# Patient Record
Sex: MALE | Race: ASIAN | NOT HISPANIC OR LATINO
[De-identification: names, ages, dates, MRNs, and addresses within clinical notes are randomized per-mention and may not be internally consistent; named-entity substitution may affect disease eponyms.]

---

## 2020-10-06 ENCOUNTER — APPOINTMENT (OUTPATIENT)
Dept: PHYSICAL MEDICINE AND REHAB | Facility: CLINIC | Age: 65
End: 2020-10-06

## 2020-10-23 ENCOUNTER — APPOINTMENT (OUTPATIENT)
Dept: NEUROSURGERY | Facility: CLINIC | Age: 65
End: 2020-10-23
Payer: COMMERCIAL

## 2020-10-23 VITALS
HEIGHT: 68 IN | HEART RATE: 79 BPM | SYSTOLIC BLOOD PRESSURE: 108 MMHG | DIASTOLIC BLOOD PRESSURE: 70 MMHG | WEIGHT: 173 LBS | BODY MASS INDEX: 26.22 KG/M2

## 2020-10-23 PROCEDURE — 99072 ADDL SUPL MATRL&STAF TM PHE: CPT

## 2020-10-23 PROCEDURE — 99203 OFFICE O/P NEW LOW 30 MIN: CPT

## 2020-10-23 NOTE — ASSESSMENT
[FreeTextEntry1] : 65 year old male with low back pain and possible lumbar facet arthropathy.  We will begin with a trial of medically supervised PT.  As he has not had any imaging of his spine, we will obtain MRI lumbar spine to help determine the etiology of his pain.  He will return to see me once he has completed the study so that we may review the results and discuss his further treatment options.

## 2020-10-23 NOTE — HISTORY OF PRESENT ILLNESS
[de-identified] : Patient has been to the ER 5 times over the past few years secondary to severe excruciating lower back pain.  He has also been bed-bound for 7 days when his pain is severe.   [Back] : back [___ yrs] : [unfilled] year(s) ago [10] : a maximum pain level of 10/10 [Burning] : burning [Standing] : standing [Sneezing] : sneezing [Heat] : heat [PT] : PT [Medications] : medications [FreeTextEntry6] : oxycodone

## 2020-11-08 ENCOUNTER — APPOINTMENT (OUTPATIENT)
Dept: MRI IMAGING | Facility: IMAGING CENTER | Age: 65
End: 2020-11-08
Payer: COMMERCIAL

## 2020-11-08 ENCOUNTER — OUTPATIENT (OUTPATIENT)
Dept: OUTPATIENT SERVICES | Facility: HOSPITAL | Age: 65
LOS: 1 days | End: 2020-11-08
Payer: COMMERCIAL

## 2020-11-08 DIAGNOSIS — M51.36 OTHER INTERVERTEBRAL DISC DEGENERATION, LUMBAR REGION: ICD-10-CM

## 2020-11-08 PROCEDURE — 72148 MRI LUMBAR SPINE W/O DYE: CPT

## 2020-11-08 PROCEDURE — 72148 MRI LUMBAR SPINE W/O DYE: CPT | Mod: 26

## 2020-11-20 ENCOUNTER — APPOINTMENT (OUTPATIENT)
Dept: NEUROSURGERY | Facility: CLINIC | Age: 65
End: 2020-11-20
Payer: COMMERCIAL

## 2020-11-20 VITALS
WEIGHT: 176 LBS | HEIGHT: 68 IN | BODY MASS INDEX: 26.67 KG/M2 | SYSTOLIC BLOOD PRESSURE: 117 MMHG | HEART RATE: 80 BPM | DIASTOLIC BLOOD PRESSURE: 72 MMHG

## 2020-11-20 VITALS — TEMPERATURE: 97.1 F

## 2020-11-20 DIAGNOSIS — M25.512 PAIN IN LEFT SHOULDER: ICD-10-CM

## 2020-11-20 PROCEDURE — 99213 OFFICE O/P EST LOW 20 MIN: CPT

## 2020-11-20 NOTE — DATA REVIEWED
[de-identified] : \par   MR Lumbar Spine No Cont             Final\par \par No Documents Attached\par \par \par \par \par   EXAM:  MR SPINE LUMBAR\par \par \par PROCEDURE DATE:  11/08/2020\par \par \par \par INTERPRETATION:  EXAMINATION: MRI lumbar spine without contrast\par \par CLINICAL INFORMATION: Low back pain\par \par TECHNIQUE: Multiplanar, multisequential MR imaging was performed.\par \par FINDINGS: Conus terminates at the L1 level and is normal in signal. Vertebral body heights are maintained. Alignment is normal. There are mild multilevel Modic type II endplate changes anteriorly. There is multilevel disc degeneration.\par \par T12-L1: Small central disc protrusion which slightly effaces the anterior thecal sac without cord impingement. No spinal canal stenosis or foraminal narrowing.\par \par L1-L2: Small right paracentral disc protrusion superimposed on a small disc bulge. No spinal canal stenosis or foraminal narrowing.\par \par L2-L3: Central-left paracentral disc herniation which effaces the anterior thecal sac and abuts the descending left L3 nerve root. This is superimposed on a small disc bulge. Mild to moderate spinal canal narrowing and mild to moderate bilateral foraminal narrowing.\par \par L3-L4: Small central disc protrusion superimposed on a small disc bulge. Mild spinal canal narrowing and mild to moderate bilateral foraminal narrowing.\par \par L4-L5: Small disc bulge with mild osseous ridging. Mild to moderate bilateral foraminal narrowing. No spinal canal stenosis.\par \par L5-S1: Minimal disc bulge with central posterior annular fissure. No spinal canal stenosis or foraminal narrowing.\par \par The imaged portions of the sacroiliac joints are unremarkable.\par \par There is no paraspinal muscle atrophy or edema.\par \par IMPRESSION: Multilevel spondylosis, as above. This is most pronounced at the L2-3 level where there is a central-left paracentral disc herniation which effaces the anterior thecal sac and abuts the descending left L3 nerve root.\par \par \par \par \par \par \par KEVIN CISSE MD; Attending Radiologist\par This document has been electronically signed. Nov 10 2020  9:38AM\par \par  \par \par  Ordered by: NEERAJ GODFREY       Collected/Examined: 08Nov2020 06:46PM       \par Verified by: NEERAJ GODFREY 10Nov2020 09:53AM       \par  Result Communication: Schedule appointment to discuss results;\par Stage: Final       \par  Performed at: [facility] at the [facility]       Resulted: 10Nov2020 09:42AM       Last Updated: 10Nov2020 09:53AM       Accession: [id number]

## 2020-11-20 NOTE — ASSESSMENT
[FreeTextEntry1] : 65 year old male with low back pain and lumbar radicular pain secondary to disc herniation.  As he is not in acute pain, he will continue with PT.  He will also consult with Dr. Johnson for his left shoulder pain.

## 2020-11-20 NOTE — REASON FOR VISIT
[Follow-Up: _____] : a [unfilled] follow-up visit [FreeTextEntry1] : Patient returns after completing MRI lumbar spine.  He is here to review the results.

## 2020-12-18 ENCOUNTER — APPOINTMENT (OUTPATIENT)
Dept: ORTHOPEDIC SURGERY | Facility: CLINIC | Age: 65
End: 2020-12-18

## 2021-01-15 ENCOUNTER — APPOINTMENT (OUTPATIENT)
Dept: OPHTHALMOLOGY | Facility: CLINIC | Age: 66
End: 2021-01-15

## 2021-02-05 ENCOUNTER — TRANSCRIPTION ENCOUNTER (OUTPATIENT)
Age: 66
End: 2021-02-05

## 2021-08-17 ENCOUNTER — APPOINTMENT (OUTPATIENT)
Dept: OPHTHALMOLOGY | Facility: CLINIC | Age: 66
End: 2021-08-17
Payer: COMMERCIAL

## 2021-08-17 ENCOUNTER — NON-APPOINTMENT (OUTPATIENT)
Age: 66
End: 2021-08-17

## 2021-08-17 PROCEDURE — 92004 COMPRE OPH EXAM NEW PT 1/>: CPT

## 2022-03-25 ENCOUNTER — APPOINTMENT (OUTPATIENT)
Dept: PHYSICAL MEDICINE AND REHAB | Facility: CLINIC | Age: 67
End: 2022-03-25

## 2022-05-18 ENCOUNTER — APPOINTMENT (OUTPATIENT)
Dept: GASTROENTEROLOGY | Facility: CLINIC | Age: 67
End: 2022-05-18
Payer: COMMERCIAL

## 2022-05-18 VITALS
HEART RATE: 65 BPM | HEIGHT: 68 IN | BODY MASS INDEX: 26.52 KG/M2 | WEIGHT: 175 LBS | DIASTOLIC BLOOD PRESSURE: 66 MMHG | SYSTOLIC BLOOD PRESSURE: 110 MMHG

## 2022-05-18 DIAGNOSIS — K59.00 CONSTIPATION, UNSPECIFIED: ICD-10-CM

## 2022-05-18 PROCEDURE — 82274 ASSAY TEST FOR BLOOD FECAL: CPT | Mod: NC,QW

## 2022-05-18 PROCEDURE — 99204 OFFICE O/P NEW MOD 45 MIN: CPT

## 2022-05-18 NOTE — ASSESSMENT
[FreeTextEntry1] : 1.  Chronic constipation, may be due to endocrine disorder, medications, or slow transit.\par 2.  Mild anemia (Hb:12.7).  FIT and Hemoccult negative today.  No prior colonoscopy or endoscopy.\par 3.  Diabetes mellitus.\par 4.  Hypothyroidism.\par 5.  Chronic lower back pain/ disc degeneration.\par \par Recs:\par - Labs reviewed on son's phone.\par - The patient was advised to use a fiber supplement with adequate fluids.  He was advised to take a laxative PRN for constipation.  Samples of Miralax and Dulcolax given today but patient was advised that he could also try Senna and milk of magnesia.\par - The patient was advised to undergo a colonoscopy and possible endoscopy for evaluation of anemia.  He is reluctant to undergo procedures at this time.  The patient was advised of possible occult GI bleeding and sources of such bleeding, including GI tract malignancies.  He was advised to follow up with his PCP for surveillance of anemia.  If his Hb decreases further or he experiences overt bleeding, he was advised to reconsider procedures.

## 2022-05-18 NOTE — PHYSICAL EXAM
[General Appearance - Alert] : alert [General Appearance - In No Acute Distress] : in no acute distress [Sclera] : the sclera and conjunctiva were normal [Extraocular Movements] : extraocular movements were intact [Outer Ear] : the ears and nose were normal in appearance [Hearing Threshold Finger Rub Not Elbert] : hearing was normal [Neck Cervical Mass (___cm)] : no neck mass was observed [Auscultation Breath Sounds / Voice Sounds] : lungs were clear to auscultation bilaterally [Heart Rate And Rhythm] : heart rate was normal and rhythm regular [Heart Sounds] : normal S1 and S2 [Edema] : there was no peripheral edema [Bowel Sounds] : normal bowel sounds [Abdomen Soft] : soft [Abdomen Tenderness] : non-tender [] : no hepato-splenomegaly [Abdomen Mass (___ Cm)] : no abdominal mass palpated [Abdomen Hernia] : no hernia was discovered [Normal Sphincter Tone] : normal sphincter tone [Cervical Lymph Nodes Enlarged Anterior Bilaterally] : anterior cervical [Supraclavicular Lymph Nodes Enlarged Bilaterally] : supraclavicular [No CVA Tenderness] : no ~M costovertebral angle tenderness [Abnormal Walk] : normal gait [Musculoskeletal - Swelling] : no joint swelling seen [Skin Color & Pigmentation] : normal skin color and pigmentation [Skin Turgor] : normal skin turgor [No Focal Deficits] : no focal deficits [Oriented To Time, Place, And Person] : oriented to person, place, and time [Impaired Insight] : insight and judgment were intact [External Hemorrhoid] : no external hemorrhoids [Occult Blood Positive] : stool was negative for occult blood [FreeTextEntry1] : brown stool, FIT negative

## 2022-05-18 NOTE — HISTORY OF PRESENT ILLNESS
[Heartburn] : denies heartburn [Nausea] : denies nausea [Vomiting] : denies vomiting [Diarrhea] : denies diarrhea [Constipation] : stable constipation [Yellow Skin Or Eyes (Jaundice)] : denies jaundice [Abdominal Pain] : denies abdominal pain [Abdominal Swelling] : denies abdominal swelling [Rectal Pain] : denies rectal pain [de-identified] : Tm presents with his son for evaluation of constipation.  He had previously seen Dr. Carlin for similar complaints in 2016.\par \par The patient reports that he has had worsening constipation over the past 3-4 years.  Since he was younger, he did not move his bowels daily but his stools have become harder and he sometimes does not have an urge to defecate.  He can go 1-3 days without a BM.  The constipation symptoms occurred around the time he was diagnosed with DM and hypothyroidism.  Per Dr. Carlin's note from 2016, his BMs had improved with levothyroxine.  He denies any upper GI symptoms, abdominal or rectal pain, GI bleeding, weight loss or family history of GI malignancies.  The patient was noted to recently have a Hb of 12.7.  His PCP advised him to undergo a screening colonoscopy but the patient is not interested in one at this time.

## 2022-06-23 ENCOUNTER — INPATIENT (INPATIENT)
Facility: HOSPITAL | Age: 67
LOS: 2 days | Discharge: HOME | End: 2022-06-26
Attending: HOSPITALIST | Admitting: HOSPITALIST
Payer: COMMERCIAL

## 2022-06-23 VITALS
DIASTOLIC BLOOD PRESSURE: 76 MMHG | RESPIRATION RATE: 14 BRPM | TEMPERATURE: 97 F | SYSTOLIC BLOOD PRESSURE: 130 MMHG | HEART RATE: 62 BPM | OXYGEN SATURATION: 99 %

## 2022-06-23 DIAGNOSIS — R42 DIZZINESS AND GIDDINESS: ICD-10-CM

## 2022-06-23 LAB
ALBUMIN SERPL ELPH-MCNC: 4.2 G/DL — SIGNIFICANT CHANGE UP (ref 3.3–5)
ALP SERPL-CCNC: 73 U/L — SIGNIFICANT CHANGE UP (ref 40–120)
ALT FLD-CCNC: 27 U/L — SIGNIFICANT CHANGE UP (ref 4–41)
ANION GAP SERPL CALC-SCNC: 9 MMOL/L — SIGNIFICANT CHANGE UP (ref 7–14)
AST SERPL-CCNC: 24 U/L — SIGNIFICANT CHANGE UP (ref 4–40)
BASOPHILS # BLD AUTO: 0.04 K/UL — SIGNIFICANT CHANGE UP (ref 0–0.2)
BASOPHILS NFR BLD AUTO: 0.7 % — SIGNIFICANT CHANGE UP (ref 0–2)
BILIRUB SERPL-MCNC: 0.4 MG/DL — SIGNIFICANT CHANGE UP (ref 0.2–1.2)
BUN SERPL-MCNC: 12 MG/DL — SIGNIFICANT CHANGE UP (ref 7–23)
CALCIUM SERPL-MCNC: 9.4 MG/DL — SIGNIFICANT CHANGE UP (ref 8.4–10.5)
CHLORIDE SERPL-SCNC: 106 MMOL/L — SIGNIFICANT CHANGE UP (ref 98–107)
CO2 SERPL-SCNC: 24 MMOL/L — SIGNIFICANT CHANGE UP (ref 22–31)
CREAT SERPL-MCNC: 1.12 MG/DL — SIGNIFICANT CHANGE UP (ref 0.5–1.3)
EGFR: 73 ML/MIN/1.73M2 — SIGNIFICANT CHANGE UP
EOSINOPHIL # BLD AUTO: 0.41 K/UL — SIGNIFICANT CHANGE UP (ref 0–0.5)
EOSINOPHIL NFR BLD AUTO: 7.7 % — HIGH (ref 0–6)
FLUAV AG NPH QL: SIGNIFICANT CHANGE UP
FLUBV AG NPH QL: SIGNIFICANT CHANGE UP
GLUCOSE SERPL-MCNC: 155 MG/DL — HIGH (ref 70–99)
HCT VFR BLD CALC: 42.7 % — SIGNIFICANT CHANGE UP (ref 39–50)
HGB BLD-MCNC: 13.2 G/DL — SIGNIFICANT CHANGE UP (ref 13–17)
IANC: 2.54 K/UL — SIGNIFICANT CHANGE UP (ref 1.8–7.4)
IMM GRANULOCYTES NFR BLD AUTO: 0.7 % — SIGNIFICANT CHANGE UP (ref 0–1.5)
LYMPHOCYTES # BLD AUTO: 1.76 K/UL — SIGNIFICANT CHANGE UP (ref 1–3.3)
LYMPHOCYTES # BLD AUTO: 32.9 % — SIGNIFICANT CHANGE UP (ref 13–44)
MAGNESIUM SERPL-MCNC: 2.3 MG/DL — SIGNIFICANT CHANGE UP (ref 1.6–2.6)
MCHC RBC-ENTMCNC: 28 PG — SIGNIFICANT CHANGE UP (ref 27–34)
MCHC RBC-ENTMCNC: 30.9 GM/DL — LOW (ref 32–36)
MCV RBC AUTO: 90.7 FL — SIGNIFICANT CHANGE UP (ref 80–100)
MONOCYTES # BLD AUTO: 0.56 K/UL — SIGNIFICANT CHANGE UP (ref 0–0.9)
MONOCYTES NFR BLD AUTO: 10.5 % — SIGNIFICANT CHANGE UP (ref 2–14)
NEUTROPHILS # BLD AUTO: 2.54 K/UL — SIGNIFICANT CHANGE UP (ref 1.8–7.4)
NEUTROPHILS NFR BLD AUTO: 47.5 % — SIGNIFICANT CHANGE UP (ref 43–77)
NRBC # BLD: 0 /100 WBCS — SIGNIFICANT CHANGE UP
NRBC # FLD: 0 K/UL — SIGNIFICANT CHANGE UP
PLATELET # BLD AUTO: 170 K/UL — SIGNIFICANT CHANGE UP (ref 150–400)
POTASSIUM SERPL-MCNC: 4.2 MMOL/L — SIGNIFICANT CHANGE UP (ref 3.5–5.3)
POTASSIUM SERPL-SCNC: 4.2 MMOL/L — SIGNIFICANT CHANGE UP (ref 3.5–5.3)
PROT SERPL-MCNC: 7.2 G/DL — SIGNIFICANT CHANGE UP (ref 6–8.3)
RBC # BLD: 4.71 M/UL — SIGNIFICANT CHANGE UP (ref 4.2–5.8)
RBC # FLD: 13.9 % — SIGNIFICANT CHANGE UP (ref 10.3–14.5)
RSV RNA NPH QL NAA+NON-PROBE: SIGNIFICANT CHANGE UP
SARS-COV-2 RNA SPEC QL NAA+PROBE: SIGNIFICANT CHANGE UP
SODIUM SERPL-SCNC: 139 MMOL/L — SIGNIFICANT CHANGE UP (ref 135–145)
WBC # BLD: 5.35 K/UL — SIGNIFICANT CHANGE UP (ref 3.8–10.5)
WBC # FLD AUTO: 5.35 K/UL — SIGNIFICANT CHANGE UP (ref 3.8–10.5)

## 2022-06-23 PROCEDURE — 70496 CT ANGIOGRAPHY HEAD: CPT | Mod: 26,MD

## 2022-06-23 PROCEDURE — 99285 EMERGENCY DEPT VISIT HI MDM: CPT

## 2022-06-23 PROCEDURE — 70498 CT ANGIOGRAPHY NECK: CPT | Mod: 26,MD

## 2022-06-23 RX ORDER — ONDANSETRON 8 MG/1
4 TABLET, FILM COATED ORAL ONCE
Refills: 0 | Status: COMPLETED | OUTPATIENT
Start: 2022-06-23 | End: 2022-06-23

## 2022-06-23 RX ORDER — MECLIZINE HCL 12.5 MG
25 TABLET ORAL ONCE
Refills: 0 | Status: COMPLETED | OUTPATIENT
Start: 2022-06-23 | End: 2022-06-23

## 2022-06-23 RX ORDER — SODIUM CHLORIDE 9 MG/ML
1000 INJECTION, SOLUTION INTRAVENOUS ONCE
Refills: 0 | Status: COMPLETED | OUTPATIENT
Start: 2022-06-23 | End: 2022-06-23

## 2022-06-23 RX ADMIN — ONDANSETRON 4 MILLIGRAM(S): 8 TABLET, FILM COATED ORAL at 14:26

## 2022-06-23 RX ADMIN — Medication 25 MILLIGRAM(S): at 19:20

## 2022-06-23 RX ADMIN — Medication 1 MILLIGRAM(S): at 14:27

## 2022-06-23 RX ADMIN — SODIUM CHLORIDE 1000 MILLILITER(S): 9 INJECTION, SOLUTION INTRAVENOUS at 11:32

## 2022-06-23 RX ADMIN — Medication 25 MILLIGRAM(S): at 11:31

## 2022-06-23 NOTE — ED PROVIDER NOTE - OBJECTIVE STATEMENT
66m code stroke  Patient witnessed by family to appear like he was going to pass out at approximately 10am. Was at his baseline this morning. 66m presents to the ED with room spinning dizziness. Started in the middle of the night, had his head turned to the left, and when he tried to turn right noticed spinning sensation. Has been persistent every time he moves his head or sits up, improves when he stays still. No ha, ear pain/ringing/hearing loss/weakness/numbness/vision changes/cp/sob/abd pain, vomiting, diarrhea, dysuria. States he also has r. side neck pain-no stiffness. No h/o stroke/mi in patient or family.

## 2022-06-23 NOTE — ED PROVIDER NOTE - CLINICAL SUMMARY MEDICAL DECISION MAKING FREE TEXT BOX
66m presents to the ED for room spinning dizziness since middle of night, worsens on positional change, improves when still, no other symptoms, exam with no acute abnormal findings other than reproducible dizziness on moving. Suspect likely peripheral cause for vertigo given history and exam however given associated neck pain will check ct/cta head/neck to eval for neuro/vascular cause for symptoms, symptomatically treat, ekg, monitor.

## 2022-06-23 NOTE — ED PROVIDER NOTE - PHYSICAL EXAMINATION
GEN - NAD; well appearing; A+O x3   HEAD - NC/AT   EYES- PERRL, EOMI  ENT: Airway patent, mmm, Oral cavity and pharynx normal. No inflammation, swelling, exudate, or lesions.    NECK: Neck supple, nontender, from, no swelling, no regions of tenderness  PULMONARY - CTA b/l, symmetric breath sounds.   CARDIAC -s1s2, RRR, no M,G,R  ABDOMEN - +BS, ND, NT, soft, no guarding, no rebound, no masses   BACK - no CVA tenderness, Normal  spine   EXTREMITIES - FROM, symmetric pulses, capillary refill < 2 seconds, no edema   SKIN - no rash or bruising   NEUROLOGIC - ao3, cn2-12 intact, 5/5 strength in all extremities, sensation grossly intact, f-n nl, no dysdiadochokinesia  PSYCH -nl mood/affect, nl insight.

## 2022-06-23 NOTE — ED PROVIDER NOTE - NS ED ROS FT
ROS:  GENERAL: No fever, no chills  EYES: no change in vision  HEENT: no trouble swallowing, no trouble speaking  CARDIAC: no chest pain  PULMONARY: no cough, no shortness of breath  GI: no abdominal pain, no nausea, no vomiting, no diarrhea, no constipation  : No dysuria, no frequency, no change in appearance, or odor of urine  SKIN: no rashes  NEURO: no headache, no weakness, +spinning/dizzy on movement  MSK: No joint pain

## 2022-06-23 NOTE — ED PROVIDER NOTE - PROGRESS NOTE DETAILS
spoke with patients wife via [facility] -she is not consenting to administration of versed at this time. patient repeatedly moving and unable to sit still - asked us to speak with daughter, conversation in progress they do not want to administer medication for sedation at this time with this understanding that we cant diagnose a stroke and are unable to further assess diagnosis and administer treatment. DId explain through  that patient unable to get ct at this time-. Aden Redd, PGY3: Patient unable to ambulate despite medications. CTAs negative for acute pathology. Will give Reglan and additional Meclizine. Patient TBA given gait instability. Aden Redd, PGY3: Patient signed out to me, here with dizziness x1 day. Given Zofran, Ativan, and Meclizine. Pending CTA to eval for posterior involvement.

## 2022-06-23 NOTE — ED ADULT TRIAGE NOTE - CHIEF COMPLAINT QUOTE
Pt arrives via EMS c/o dizziness since last night, worsened upon standing. Denies CP, SOB or N/V/D. PMH: DM2, hypothyroidism, chronic back pain.

## 2022-06-24 ENCOUNTER — TRANSCRIPTION ENCOUNTER (OUTPATIENT)
Age: 67
End: 2022-06-24

## 2022-06-24 DIAGNOSIS — E03.9 HYPOTHYROIDISM, UNSPECIFIED: ICD-10-CM

## 2022-06-24 DIAGNOSIS — E11.9 TYPE 2 DIABETES MELLITUS WITHOUT COMPLICATIONS: ICD-10-CM

## 2022-06-24 DIAGNOSIS — R42 DIZZINESS AND GIDDINESS: ICD-10-CM

## 2022-06-24 DIAGNOSIS — Z29.9 ENCOUNTER FOR PROPHYLACTIC MEASURES, UNSPECIFIED: ICD-10-CM

## 2022-06-24 DIAGNOSIS — I10 ESSENTIAL (PRIMARY) HYPERTENSION: ICD-10-CM

## 2022-06-24 DIAGNOSIS — E78.5 HYPERLIPIDEMIA, UNSPECIFIED: ICD-10-CM

## 2022-06-24 LAB
24R-OH-CALCIDIOL SERPL-MCNC: 26.7 NG/ML — LOW (ref 30–80)
A1C WITH ESTIMATED AVERAGE GLUCOSE RESULT: 8 % — HIGH (ref 4–5.6)
ALBUMIN SERPL ELPH-MCNC: 3.9 G/DL — SIGNIFICANT CHANGE UP (ref 3.3–5)
ALP SERPL-CCNC: 69 U/L — SIGNIFICANT CHANGE UP (ref 40–120)
ALT FLD-CCNC: 25 U/L — SIGNIFICANT CHANGE UP (ref 4–41)
ANION GAP SERPL CALC-SCNC: 14 MMOL/L — SIGNIFICANT CHANGE UP (ref 7–14)
APPEARANCE UR: CLEAR — SIGNIFICANT CHANGE UP
AST SERPL-CCNC: 24 U/L — SIGNIFICANT CHANGE UP (ref 4–40)
BILIRUB SERPL-MCNC: 0.4 MG/DL — SIGNIFICANT CHANGE UP (ref 0.2–1.2)
BILIRUB UR-MCNC: NEGATIVE — SIGNIFICANT CHANGE UP
BUN SERPL-MCNC: 13 MG/DL — SIGNIFICANT CHANGE UP (ref 7–23)
CALCIUM SERPL-MCNC: 9.4 MG/DL — SIGNIFICANT CHANGE UP (ref 8.4–10.5)
CHLORIDE SERPL-SCNC: 103 MMOL/L — SIGNIFICANT CHANGE UP (ref 98–107)
CHOLEST SERPL-MCNC: 180 MG/DL — SIGNIFICANT CHANGE UP
CO2 SERPL-SCNC: 23 MMOL/L — SIGNIFICANT CHANGE UP (ref 22–31)
COLOR SPEC: SIGNIFICANT CHANGE UP
CREAT SERPL-MCNC: 1.17 MG/DL — SIGNIFICANT CHANGE UP (ref 0.5–1.3)
DIFF PNL FLD: NEGATIVE — SIGNIFICANT CHANGE UP
EGFR: 70 ML/MIN/1.73M2 — SIGNIFICANT CHANGE UP
ESTIMATED AVERAGE GLUCOSE: 183 — SIGNIFICANT CHANGE UP
FOLATE SERPL-MCNC: 10.4 NG/ML — SIGNIFICANT CHANGE UP (ref 3.1–17.5)
GLUCOSE BLDC GLUCOMTR-MCNC: 146 MG/DL — HIGH (ref 70–99)
GLUCOSE BLDC GLUCOMTR-MCNC: 159 MG/DL — HIGH (ref 70–99)
GLUCOSE BLDC GLUCOMTR-MCNC: 175 MG/DL — HIGH (ref 70–99)
GLUCOSE SERPL-MCNC: 128 MG/DL — HIGH (ref 70–99)
GLUCOSE UR QL: ABNORMAL
HCT VFR BLD CALC: 44.6 % — SIGNIFICANT CHANGE UP (ref 39–50)
HCV AB S/CO SERPL IA: 0.12 S/CO — SIGNIFICANT CHANGE UP (ref 0–0.99)
HCV AB SERPL-IMP: SIGNIFICANT CHANGE UP
HDLC SERPL-MCNC: 32 MG/DL — LOW
HGB BLD-MCNC: 13.4 G/DL — SIGNIFICANT CHANGE UP (ref 13–17)
KETONES UR-MCNC: NEGATIVE — SIGNIFICANT CHANGE UP
LEUKOCYTE ESTERASE UR-ACNC: NEGATIVE — SIGNIFICANT CHANGE UP
LIPID PNL WITH DIRECT LDL SERPL: 109 MG/DL — HIGH
MAGNESIUM SERPL-MCNC: 2.2 MG/DL — SIGNIFICANT CHANGE UP (ref 1.6–2.6)
MCHC RBC-ENTMCNC: 27.7 PG — SIGNIFICANT CHANGE UP (ref 27–34)
MCHC RBC-ENTMCNC: 30 GM/DL — LOW (ref 32–36)
MCV RBC AUTO: 92.1 FL — SIGNIFICANT CHANGE UP (ref 80–100)
NITRITE UR-MCNC: NEGATIVE — SIGNIFICANT CHANGE UP
NON HDL CHOLESTEROL: 148 MG/DL — HIGH
NRBC # BLD: 0 /100 WBCS — SIGNIFICANT CHANGE UP
NRBC # FLD: 0 K/UL — SIGNIFICANT CHANGE UP
PH UR: 6 — SIGNIFICANT CHANGE UP (ref 5–8)
PHOSPHATE SERPL-MCNC: 3.6 MG/DL — SIGNIFICANT CHANGE UP (ref 2.5–4.5)
PLATELET # BLD AUTO: 156 K/UL — SIGNIFICANT CHANGE UP (ref 150–400)
POTASSIUM SERPL-MCNC: 4 MMOL/L — SIGNIFICANT CHANGE UP (ref 3.5–5.3)
POTASSIUM SERPL-SCNC: 4 MMOL/L — SIGNIFICANT CHANGE UP (ref 3.5–5.3)
PROT SERPL-MCNC: 6.4 G/DL — SIGNIFICANT CHANGE UP (ref 6–8.3)
PROT UR-MCNC: NEGATIVE — SIGNIFICANT CHANGE UP
RBC # BLD: 4.84 M/UL — SIGNIFICANT CHANGE UP (ref 4.2–5.8)
RBC # FLD: 13.8 % — SIGNIFICANT CHANGE UP (ref 10.3–14.5)
SODIUM SERPL-SCNC: 140 MMOL/L — SIGNIFICANT CHANGE UP (ref 135–145)
SP GR SPEC: 1.01 — SIGNIFICANT CHANGE UP (ref 1–1.05)
TRIGL SERPL-MCNC: 196 MG/DL — HIGH
TSH SERPL-MCNC: 0.33 UIU/ML — SIGNIFICANT CHANGE UP (ref 0.27–4.2)
UROBILINOGEN FLD QL: SIGNIFICANT CHANGE UP
VIT B12 SERPL-MCNC: 245 PG/ML — SIGNIFICANT CHANGE UP (ref 200–900)
WBC # BLD: 5.4 K/UL — SIGNIFICANT CHANGE UP (ref 3.8–10.5)
WBC # FLD AUTO: 5.4 K/UL — SIGNIFICANT CHANGE UP (ref 3.8–10.5)

## 2022-06-24 PROCEDURE — 12345: CPT | Mod: NC

## 2022-06-24 PROCEDURE — 99223 1ST HOSP IP/OBS HIGH 75: CPT

## 2022-06-24 PROCEDURE — 99222 1ST HOSP IP/OBS MODERATE 55: CPT

## 2022-06-24 PROCEDURE — 93306 TTE W/DOPPLER COMPLETE: CPT | Mod: 26

## 2022-06-24 RX ORDER — LEVOTHYROXINE SODIUM 125 MCG
100 TABLET ORAL DAILY
Refills: 0 | Status: DISCONTINUED | OUTPATIENT
Start: 2022-06-24 | End: 2022-06-26

## 2022-06-24 RX ORDER — SODIUM CHLORIDE 9 MG/ML
1000 INJECTION, SOLUTION INTRAVENOUS
Refills: 0 | Status: DISCONTINUED | OUTPATIENT
Start: 2022-06-24 | End: 2022-06-26

## 2022-06-24 RX ORDER — HEPARIN SODIUM 5000 [USP'U]/ML
5000 INJECTION INTRAVENOUS; SUBCUTANEOUS EVERY 12 HOURS
Refills: 0 | Status: DISCONTINUED | OUTPATIENT
Start: 2022-06-24 | End: 2022-06-26

## 2022-06-24 RX ORDER — DEXTROSE 50 % IN WATER 50 %
15 SYRINGE (ML) INTRAVENOUS ONCE
Refills: 0 | Status: DISCONTINUED | OUTPATIENT
Start: 2022-06-24 | End: 2022-06-26

## 2022-06-24 RX ORDER — INSULIN LISPRO 100/ML
VIAL (ML) SUBCUTANEOUS AT BEDTIME
Refills: 0 | Status: DISCONTINUED | OUTPATIENT
Start: 2022-06-24 | End: 2022-06-26

## 2022-06-24 RX ORDER — MECLIZINE HCL 12.5 MG
25 TABLET ORAL EVERY 8 HOURS
Refills: 0 | Status: DISCONTINUED | OUTPATIENT
Start: 2022-06-24 | End: 2022-06-26

## 2022-06-24 RX ORDER — ATORVASTATIN CALCIUM 80 MG/1
1 TABLET, FILM COATED ORAL
Qty: 0 | Refills: 0 | DISCHARGE

## 2022-06-24 RX ORDER — METFORMIN HYDROCHLORIDE 850 MG/1
1 TABLET ORAL
Qty: 0 | Refills: 0 | DISCHARGE

## 2022-06-24 RX ORDER — ASPIRIN/CALCIUM CARB/MAGNESIUM 324 MG
81 TABLET ORAL DAILY
Refills: 0 | Status: DISCONTINUED | OUTPATIENT
Start: 2022-06-24 | End: 2022-06-26

## 2022-06-24 RX ORDER — DEXTROSE 50 % IN WATER 50 %
25 SYRINGE (ML) INTRAVENOUS ONCE
Refills: 0 | Status: DISCONTINUED | OUTPATIENT
Start: 2022-06-24 | End: 2022-06-26

## 2022-06-24 RX ORDER — ATORVASTATIN CALCIUM 80 MG/1
20 TABLET, FILM COATED ORAL AT BEDTIME
Refills: 0 | Status: DISCONTINUED | OUTPATIENT
Start: 2022-06-24 | End: 2022-06-26

## 2022-06-24 RX ORDER — DEXTROSE 50 % IN WATER 50 %
12.5 SYRINGE (ML) INTRAVENOUS ONCE
Refills: 0 | Status: DISCONTINUED | OUTPATIENT
Start: 2022-06-24 | End: 2022-06-26

## 2022-06-24 RX ORDER — ACETAMINOPHEN 500 MG
650 TABLET ORAL EVERY 6 HOURS
Refills: 0 | Status: DISCONTINUED | OUTPATIENT
Start: 2022-06-24 | End: 2022-06-26

## 2022-06-24 RX ORDER — INSULIN LISPRO 100/ML
VIAL (ML) SUBCUTANEOUS
Refills: 0 | Status: DISCONTINUED | OUTPATIENT
Start: 2022-06-24 | End: 2022-06-26

## 2022-06-24 RX ORDER — EMPAGLIFLOZIN 10 MG/1
1 TABLET, FILM COATED ORAL
Qty: 0 | Refills: 0 | DISCHARGE

## 2022-06-24 RX ORDER — ASPIRIN/CALCIUM CARB/MAGNESIUM 324 MG
81 TABLET ORAL
Qty: 0 | Refills: 0 | DISCHARGE

## 2022-06-24 RX ORDER — GLUCAGON INJECTION, SOLUTION 0.5 MG/.1ML
1 INJECTION, SOLUTION SUBCUTANEOUS ONCE
Refills: 0 | Status: DISCONTINUED | OUTPATIENT
Start: 2022-06-24 | End: 2022-06-26

## 2022-06-24 RX ORDER — LEVOTHYROXINE SODIUM 125 MCG
1 TABLET ORAL
Qty: 0 | Refills: 0 | DISCHARGE

## 2022-06-24 RX ADMIN — Medication 1: at 09:46

## 2022-06-24 RX ADMIN — HEPARIN SODIUM 5000 UNIT(S): 5000 INJECTION INTRAVENOUS; SUBCUTANEOUS at 18:02

## 2022-06-24 RX ADMIN — SODIUM CHLORIDE 75 MILLILITER(S): 9 INJECTION, SOLUTION INTRAVENOUS at 03:36

## 2022-06-24 RX ADMIN — Medication 100 MICROGRAM(S): at 06:00

## 2022-06-24 RX ADMIN — Medication 25 MILLIGRAM(S): at 21:51

## 2022-06-24 RX ADMIN — HEPARIN SODIUM 5000 UNIT(S): 5000 INJECTION INTRAVENOUS; SUBCUTANEOUS at 05:59

## 2022-06-24 RX ADMIN — Medication 81 MILLIGRAM(S): at 12:48

## 2022-06-24 RX ADMIN — ATORVASTATIN CALCIUM 20 MILLIGRAM(S): 80 TABLET, FILM COATED ORAL at 21:51

## 2022-06-24 NOTE — OCCUPATIONAL THERAPY INITIAL EVALUATION ADULT - LIVES WITH, PROFILE
Pt. reports he lives with his wife and children in a house with 3 steps to enter. Once inside, pt. reports he has a full flight of steps to negotiate to reach 2nd floor where main bedroom and bathroom are located. Per pt., he has a bathtub in his bathroom.

## 2022-06-24 NOTE — H&P ADULT - ASSESSMENT
65yo male w/PMHx of Type 2 DM, hypothyroidism, HLD brought in by EMS from home for acute onset dizziness.

## 2022-06-24 NOTE — OCCUPATIONAL THERAPY INITIAL EVALUATION ADULT - PERTINENT HX OF CURRENT PROBLEM, REHAB EVAL
Pt is a 66 year old male with hx of Type 2 DM, hypothyroidism, & HLD, who presented to [facility] on 6/23/22 for acute onset dizziness. In the ED, patient received Ativan 1mg IV x1, Meclizine 25mg PO x2, Zofran 4mg IV x1, and 1L LR bolus. CT Angio Head/Neck was unremarkable.

## 2022-06-24 NOTE — CONSULT NOTE ADULT - ASSESSMENT
INCOMPLETE ( TO BE UPDATED)  Assessment:       Plan:  -To be admitted under medicine service  -MRI brain w/ w/o  -Can try meclizine vs low-dose valium  -IVF  -Further recs pending sx improvement and MR imaging    -Management & disposition discussed/NOT discussed with neuro attending, Dr. Montanez Assessment:  66 yea old male, PMH HTN, DM presents to the ED with room spinning dizziness. Started in the middle of the night, had his head turned to the left, and when he tried to turn right noticed spinning sensation. Has been persistent every time he moves his head or sits up, improves when he stays still. No ha, ear pain/ringing/hearing loss/weakness/numbness/vision changes/cp/sob/abd pain, vomiting, diarrhea, dysuria. States he also has r. side neck pain-no stiffness. No h/o stroke in the past. Denies vision changes, trouble w/ speech or swallowing, falls, neck pain, focal numbness/tingling/paresis. Received Reglan and Meclizine in the ED, w/ no improvement. No gross neuro deficits seen on exam. Overall generalized weakness. No acute findings seen on CT imaging. Overall presentation suggests peripheral vestibular dysfunction rather than central etiology.       Plan:  -To be admitted under medicine service  -MRI brain w/ w/o  -Can try meclizine vs low-dose valium  -IVF  -PT/OT  -Further recs pending sx improvement and MR imaging    -Management & disposition to be discussed with neuro attending, Dr. Montanez

## 2022-06-24 NOTE — DISCHARGE NOTE PROVIDER - NSDCMRMEDTOKEN_GEN_ALL_CORE_FT
aspirin 81 mg oral tablet: 81 milligram(s) orally once a day  atorvastatin 20 mg oral tablet: 1 tab(s) orally once a day  Jardiance 10 mg oral tablet: 1 tab(s) orally once a day (in the morning)  levothyroxine 100 mcg (0.1 mg) oral tablet: 1 tab(s) orally once a day  metFORMIN 500 mg oral tablet: 1 tab(s) orally 2 times a day   aspirin 81 mg oral tablet: 81 milligram(s) orally once a day  atorvastatin 20 mg oral tablet: 1 tab(s) orally once a day  Jardiance 10 mg oral tablet: 1 tab(s) orally once a day (in the morning)  levothyroxine 100 mcg (0.1 mg) oral tablet: 1 tab(s) orally once a day  meclizine 25 mg oral tablet: 1 tab(s) orally every 8 hours, As needed, Dizziness  metFORMIN 500 mg oral tablet: 1 tab(s) orally 2 times a day  Occupational therapy: Outpatient occupational therapy 3 times a week  physical therapy: Physical therapy 3 times a week

## 2022-06-24 NOTE — DISCHARGE NOTE PROVIDER - HOSPITAL COURSE
65 y/o male with PMHx of DM type 2, Hypothyroid who presented with dizziness, admitted to rule out CVA. CTA H/N without acute findings.   MRI --   Echo ---   PT recommended home PT.     Cleared for discharge 65 y/o male with PMHx of DM type 2, Hypothyroid who presented with dizziness, admitted to rule out CVA. CTA H/N without acute findings. Orthostatic vital signs negative. Meclizine started with mild improvement in symptoms.   MRI --   Echo ---   PT recommended home PT.     Cleared for discharge on -- by -- 65 y/o male with PMHx of DM type 2, Hypothyroid who presented with dizziness, admitted to rule out CVA.     Dizziness.   -likely due to benign positional vertigo  -pt w/ dizziness when turning head to the right; worse w/ ambulation  -CTA Brain and Neck - w/o acute findings   -MRI Head w/w/o IV con-no mass, infarct, or hemorrhage. No posterior circuit CVA or schwannoma.  -House neuro consulted, recs appreciated   -Meclizine 25 PRN   - LR @75cc/hr  -orthostatics negative    -TTE - Ejection Fraction (Visual Estimate): 60 %.    -PT consult - home with home PT       Type 2 diabetes mellitus.   - Hold oral DM meds while inpatient   - A1C 8.0%  - ISS  - monitor FS  - diabetic diet.    Hyperlipidemia.   -Continue statin  - lipid profile: total cholesterol 180, triglycerides 196, HDL 32, Non-,      Hypothyroidism.   -Continue home med Synthroid 100mcg daily  -TSH 0.33.       Patient is medically cleared for discharge on__________. Case discussed with ____________ 65 y/o male with PMHx of DM type 2, Hypothyroid who presented with dizziness, admitted to rule out CVA.     Dizziness.   -likely due to benign positional vertigo  -pt w/ dizziness when turning head to the right; worse w/ ambulation  -CTA Brain and Neck - w/o acute findings   -MRI Head w/w/o IV con-no mass, infarct, or hemorrhage. No posterior circuit CVA or schwannoma. Central etiology for dizziness ruled out.   -House neuro consulted, recs appreciated   -Meclizine 25 PRN   - s/p LR @75cc/hr  -orthostatics negative    -TTE - Ejection Fraction (Visual Estimate): 60 %.    -PT consult - home with home PT       Type 2 diabetes mellitus.   - Hold oral DM meds while inpatient   - A1C 8.0%  - ISS  - monitor FS  - diabetic diet.    Hyperlipidemia.   -Continue statin  - lipid profile: total cholesterol 180, triglycerides 196, HDL 32, Non-,      Hypothyroidism.   -Continue home med Synthroid 100mcg daily  -TSH 0.33.       Patient is medically cleared for discharge on 06/26/22. Case discussed with Dr. Corea. 67 y/o male with PMHx of DM type 2, Hypothyroid who presented with dizziness, admitted to rule out CVA.     Dizziness.   -likely due to benign positional vertigo  -pt w/ dizziness when turning head to the right; worse w/ ambulation  -CTA Brain and Neck - w/o acute findings   -MRI Head w/w/o IV con-no mass, infarct, or hemorrhage. No posterior circuit CVA or schwannoma. Central etiology for dizziness ruled out.   -House neuro consulted, recs appreciated   -Meclizine 25 PRN   - s/p LR @75cc/hr  -orthostatics negative    -TTE - Ejection Fraction (Visual Estimate): 60%.    -PT consult - home with home PT       Type 2 diabetes mellitus.   - Hold oral DM meds while inpatient   - A1C 8.0%  - ISS  - monitor FS  - diabetic diet.    Hyperlipidemia.   -Continue statin  - lipid profile: total cholesterol 180, triglycerides 196, HDL 32, Non-,      Hypothyroidism.   -Continue home med Synthroid 100mcg daily  -TSH 0.33.       Patient is medically cleared for discharge on 06/26/22. Case discussed with Dr. Corea.

## 2022-06-24 NOTE — CONSULT NOTE ADULT - SUBJECTIVE AND OBJECTIVE BOX
HPI: 66m presents to the ED with room spinning dizziness. Started in the middle of the night, had his head turned to the left, and when he tried to turn right noticed spinning sensation. Has been persistent every time he moves his head or sits up, improves when he stays still. No ha, ear pain/ringing/hearing loss/weakness/numbness/vision changes/cp/sob/abd pain, vomiting, diarrhea, dysuria. States he also has r. side neck pain-no stiffness. No h/o stroke/mi in patient or family.      REVIEW OF SYSTEMS  Per HPI    PAST MEDICAL & SURGICAL HISTORY:  Diabetes      HTN (hypertension)        FAMILY HISTORY:    SOCIAL HISTORY:   T/E/D:   Occupation:   Lives with:     MEDICATIONS (HOME):  Home Medications:    MEDICATIONS  (STANDING):    MEDICATIONS  (PRN):    ALLERGIES/INTOLERANCES:  Allergies  No Known Allergies    Intolerances    VITALS & EXAMINATION:  Vital Signs Last 24 Hrs  T(C): 36.6 (24 Jun 2022 00:27), Max: 36.7 (23 Jun 2022 14:18)  T(F): 97.8 (24 Jun 2022 00:27), Max: 98.1 (23 Jun 2022 14:18)  HR: 65 (24 Jun 2022 00:27) (57 - 67)  BP: 113/68 (24 Jun 2022 00:27) (105/66 - 130/76)  BP(mean): --  RR: 18 (24 Jun 2022 00:27) (14 - 18)  SpO2: 96% (24 Jun 2022 00:27) (96% - 100%)    General:  INCOMPLETE ( TO BE UPDATED)  Constitutional: Male, appears stated age, in no apparent distress including pain  Head: Normocephalic & atraumatic.    Neurological (>12):  MS: Awake, alert, oriented to person, place, situation, time. Normal affect. Follows all commands.    Language: Speech is clear, fluent     CNs: PERRLA (R = 3mm, L = 3mm). VFF. EOMI no nystagmus, no diplopia. V1-3 intact to LT/pinprick, well developed masseter muscles b/l. No facial asymmetry b/l, full eye closure strength b/l. Hearing grossly normal (rubbing fingers) b/l. Symmetric palate elevation in midline. Gag reflex deferred. Head turning & shoulder shrug intact b/l. Tongue midline, normal movements, no atrophy.    Motor: Normal muscle bulk & tone. No noticeable tremor or seizure. No pronator drift. 5/5 strength throughout	     Sensation: Intact to LT/PP/Temp/Vibration/Position b/l throughout.     Cortical: Extinction on DSS (neglect): none    Coordination: No dysmetria to FTN    Gait: Normal Romberg. No postural instability. Normal stance and tandem gait.     LABORATORY:  CBC                       13.2   5.35  )-----------( 170      ( 23 Jun 2022 11:27 )             42.7     Chem 06-23    139  |  106  |  12  ----------------------------<  155<H>  4.2   |  24  |  1.12    Ca    9.4      23 Jun 2022 11:27  Mg     2.30     06-23    TPro  7.2  /  Alb  4.2  /  TBili  0.4  /  DBili  x   /  AST  24  /  ALT  27  /  AlkPhos  73  06-23    LFTs LIVER FUNCTIONS - ( 23 Jun 2022 11:27 )  Alb: 4.2 g/dL / Pro: 7.2 g/dL / ALK PHOS: 73 U/L / ALT: 27 U/L / AST: 24 U/L / GGT: x           Coagulopathy   Lipid Panel   A1c   Cardiac enzymes     U/A   CSF  Immunological  Other    STUDIES & IMAGING:  Studies (EKG, EEG, EMG, etc):     Radiology (XR, CT, MR, U/S, TTE/SOCRATES):    Neck CTA:    The visualized aorta and great vessels are unremarkable. The common   carotid arteries are normal in appearance.  The internal carotid arteries and external carotid arteries are patent.  The vertebral arteries are patent.    Brain CTA:    The distal internal carotid arteries are patent.  The [place] of [place] is normal in appearance.  The visualized cerebral arteries are unremarkable.  The vertebrobasilar junction and basilar artery are normal.    All measurements are performed using standard NASCET criteria.    CT of the head demonstrates the ventricles and sulci to be normal in   appearance. No intracranial mass effect or blood is seen. No areas of   abnormal attenuation or abnormal enhancement are seen.    IMPRESSION:  Normal exam. HPI: 66 yea old male, PMH HTN, DM presents to the ED with room spinning dizziness. Started in the middle of the night, had his head turned to the left, and when he tried to turn right noticed spinning sensation. Has been persistent every time he moves his head or sits up, improves when he stays still. No ha, ear pain/ringing/hearing loss/weakness/numbness/vision changes/cp/sob/abd pain, vomiting, diarrhea, dysuria. States he also has r. side neck pain-no stiffness. No h/o stroke in the past. Denies vision changes, trouble w/ speech or swallowing, falls, neck pain, focal numbness/tingling/paresis. Received Reglan and Meclizine in the ED, w/ no improvement.       REVIEW OF SYSTEMS  Per HPI    PAST MEDICAL & SURGICAL HISTORY:  Diabetes      HTN (hypertension)        FAMILY HISTORY:    SOCIAL HISTORY:   T/E/D:   Occupation:   Lives with:     MEDICATIONS (HOME):  Home Medications:    MEDICATIONS  (STANDING):    MEDICATIONS  (PRN):    ALLERGIES/INTOLERANCES:  Allergies  No Known Allergies    Intolerances    VITALS & EXAMINATION:  Vital Signs Last 24 Hrs  T(C): 36.6 (24 Jun 2022 00:27), Max: 36.7 (23 Jun 2022 14:18)  T(F): 97.8 (24 Jun 2022 00:27), Max: 98.1 (23 Jun 2022 14:18)  HR: 65 (24 Jun 2022 00:27) (57 - 67)  BP: 113/68 (24 Jun 2022 00:27) (105/66 - 130/76)  BP(mean): --  RR: 18 (24 Jun 2022 00:27) (14 - 18)  SpO2: 96% (24 Jun 2022 00:27) (96% - 100%)    General:  Constitutional: Male, appears stated age, in no apparent distress including pain  Head: Normocephalic & atraumatic.    Neurological (>12):  MS: Awake, alert, oriented to person, place, situation, time. Normal affect. Follows all commands.    Language: Speech is clear, fluent     CNs: PERRLA (R = 3mm, L = 3mm). VFF. EOMI no nystagmus. V1-3 intact to LT. No facial asymmetry b/l, full eye closure strength b/l. Gag reflex deferred. Head turning & shoulder shrug intact b/l. Tongue midline, normal movements, no atrophy.    Motor: Normal muscle bulk & tone. No noticeable tremor or seizure. No pronator drift. 5/5 strength throughout	     Sensation: Intact to LT b/l throughout.     Cortical: Extinction on DSS (neglect): none    Coordination: No dysmetria to FTN    Gait: Deferred     Negative HINT's and Goodrich-Hallpike    LABORATORY:  CBC                       13.2   5.35  )-----------( 170      ( 23 Jun 2022 11:27 )             42.7     Chem 06-23    139  |  106  |  12  ----------------------------<  155<H>  4.2   |  24  |  1.12    Ca    9.4      23 Jun 2022 11:27  Mg     2.30     06-23    TPro  7.2  /  Alb  4.2  /  TBili  0.4  /  DBili  x   /  AST  24  /  ALT  27  /  AlkPhos  73  06-23    LFTs LIVER FUNCTIONS - ( 23 Jun 2022 11:27 )  Alb: 4.2 g/dL / Pro: 7.2 g/dL / ALK PHOS: 73 U/L / ALT: 27 U/L / AST: 24 U/L / GGT: x           Coagulopathy   Lipid Panel   A1c   Cardiac enzymes     U/A   CSF  Immunological  Other    STUDIES & IMAGING:  Studies (EKG, EEG, EMG, etc):     Radiology (XR, CT, MR, U/S, TTE/SOCRATES):    Neck CTA:    The visualized aorta and great vessels are unremarkable. The common   carotid arteries are normal in appearance.  The internal carotid arteries and external carotid arteries are patent.  The vertebral arteries are patent.    Brain CTA:    The distal internal carotid arteries are patent.  The [place] of [place] is normal in appearance.  The visualized cerebral arteries are unremarkable.  The vertebrobasilar junction and basilar artery are normal.    All measurements are performed using standard NASCET criteria.    CT of the head demonstrates the ventricles and sulci to be normal in   appearance. No intracranial mass effect or blood is seen. No areas of   abnormal attenuation or abnormal enhancement are seen.    IMPRESSION:  Normal exam.

## 2022-06-24 NOTE — DISCHARGE NOTE PROVIDER - NSFOLLOWUPCLINICS_GEN_ALL_ED_FT
[facility] - ENT  Otolaryngology (ENT)  [address] [place] [address]  Phone: [phone]  Fax:

## 2022-06-24 NOTE — OCCUPATIONAL THERAPY INITIAL EVALUATION ADULT - MD ORDER
Occupational Therapy (OT) to evaluate and treat. Ambulate with assistance. Per JIMBO Cody, pt is okay to participate in OT evaluation and perform activity as tolerated.

## 2022-06-24 NOTE — H&P ADULT - NSHPLABSRESULTS_GEN_ALL_CORE
Labs:                        13.2   5.35  )-----------( 170      ( 23 Jun 2022 11:27 )             42.7     06-23    139  |  106  |  12  ----------------------------<  155<H>  4.2   |  24  |  1.12    Ca    9.4      23 Jun 2022 11:27  Mg     2.30     06-23    TPro  7.2  /  Alb  4.2  /  TBili  0.4  /  DBili  x   /  AST  24  /  ALT  27  /  AlkPhos  73  06-23        Urinalysis Basic (06-24-22 @ 02:39):    CAPILLARY BLOOD GLUCOSE:  POCT Blood Glucose: 93 mg/dL (06-23-22 @ 14:29)  POCT Blood Glucose: 160 mg/dL (06-23-22 @ 10:30)    EKG: NSR at 59BPM, qtc 352    Neck CTA:  The visualized aorta and great vessels are unremarkable. The common   carotid arteries are normal in appearance.  The internal carotid arteries and external carotid arteries are patent.  The vertebral arteries are patent.    Brain CTA:  The distal internal carotid arteries are patent.  The [place] of [place] is normal in appearance.  The visualized cerebral arteries are unremarkable.  The vertebrobasilar junction and basilar artery are normal.

## 2022-06-24 NOTE — H&P ADULT - HISTORY OF PRESENT ILLNESS
This is a 65yo male w/PMHx of Type 2 DM, hypothyroidism, HLD brought in by EMS from home for acute onset dizziness. Patient was sleeping in bed when he awoke from sleep and felt dizzy when turning to his right. He describes the dizziness as the room is spinning around him. It is provoked when turning his head to the right. Patient states he feels dizzy currently even at rest. Patient denies falls, loss of consciousness incontinence, trauma, fever or recent infection, hearing or visual changes. Patient denies history of CVA, TBI, Cancer, cardio or pulmonary disease. Pt denies headache, blurry vision, chest pain, SOB, abdominal pain, n/v/d, dysuria, hematuria.    In the ED, patient received Ativan 1mg IV x1, Meclizine 25mg PO x2, Zofran 4mg IV x1, and 1L LR bolus. CT angio head/neck was unremarkable.

## 2022-06-24 NOTE — CONSULT NOTE ADULT - ATTENDING COMMENTS
Isolated, positional vertigo c/w benign paroxysmal positional vertigo.  No limb ataxia.   Vestibular rehabilitation  Meclizine.     Thank you    Please call us for any further questions.

## 2022-06-24 NOTE — PHYSICAL THERAPY INITIAL EVALUATION ADULT - DISCHARGE DISPOSITION, PT EVAL
anticipated discharge to home with home PT services to address current functional limitations to optimize safety within the home environment/home w/ home PT

## 2022-06-24 NOTE — PHYSICAL THERAPY INITIAL EVALUATION ADULT - DIAGNOSIS, PT EVAL
Deconditioning, decreased strength decreased balance, decreased endurance, decreased postural control all limiting pts. ability to perform functional mobility

## 2022-06-24 NOTE — H&P ADULT - PROBLEM SELECTOR PLAN 1
pt w/ dizziness when turning head to the right; worse w/ ambulation  -CTA Brain and Neck - w/o acute findings   -MRI Head w/w/o IV con   -House neuro consulted, recs appreciated   -Meclizine 25 PRN   -neuro checks and vitals q4  -orthostatics   - monitor on tele  -TTE  -PT consult  -fall and aspiration precautions, ambulate with assistance pt w/ dizziness when turning head to the right; worse w/ ambulation  -CTA Brain and Neck - w/o acute findings   -MRI Head w/w/o IV con   -House neuro consulted, recs appreciated   -Meclizine 25 PRN   - LR @75cc/hr  -neuro checks and vitals q4  -orthostatics   - monitor on tele  -TTE  -PT consult  -fall and aspiration precautions, ambulate with assistance

## 2022-06-24 NOTE — DISCHARGE NOTE PROVIDER - NSDCFUADDAPPT_GEN_ALL_CORE_FT
Please follow-up with your primary care provider, Dr. Chua, within 1-2 weeks of discharge. Please call for an appointment.  Please follow-up with your primary care provider, Dr. Chua, within 1-2 weeks of discharge. Please call for an appointment.      Please follow-up with your neurologist, Dr. Mejia, for your scheduled appointment on August 2nd at 8:00am.

## 2022-06-24 NOTE — DISCHARGE NOTE PROVIDER - NSDCFUSCHEDAPPT_GEN_ALL_CORE_FT
Daryl Mejia  [facility]  NEUROLOGY [facility]  Scheduled Appointment: 08/02/2022

## 2022-06-24 NOTE — DISCHARGE NOTE PROVIDER - NSDCCPCAREPLAN_GEN_ALL_CORE_FT
PRINCIPAL DISCHARGE DIAGNOSIS  Diagnosis: Dizziness  Assessment and Plan of Treatment: You had a CT scan of your head and an MRI of your head which were both unremarkable. Your dizziness is likely due to benign positional vertigo. You were given meclizine and you improved. Please follow-up with your primary care provider Dr. Chua within 1-2 weeks of discharge.      SECONDARY DISCHARGE DIAGNOSES  Diagnosis: Hyperlipidemia  Assessment and Plan of Treatment: Continue cholesterol control medications. Continue DASH diet. Follow up with your PCP within 1 week of discharge for further management and monitoring of lipid and cholesterol panels.    Diagnosis: Hypothyroidism  Assessment and Plan of Treatment: Please continue to take your Synthyroid as directed. Call if your doctor if you experience any constipation, fatigue, hairr loss, or weight gain. Please follow-up with your primary care provider within 1-2 weeks of discharge.    Diagnosis: Type 2 diabetes mellitus  Assessment and Plan of Treatment: Continue consistent carbohydrate diet.  Monitor blood glucose levels throughout the day before meals and at bedtime.  Record blood sugars and bring to outpatient providers appointment in order to be reviewed by your doctor for management modifications.  Be aware of diabetes management symptoms including feeling cool and clammy may be related to low glucose levels.  Feeling hot and dry may indicate high glucose levels.  If  you feel these symptoms, check your blood sugar.  Make regular podiatry appointments in order to have feet checked for wounds and toe nails cut by a doctor to prevent infections.     PRINCIPAL DISCHARGE DIAGNOSIS  Diagnosis: Dizziness  Assessment and Plan of Treatment: You had a CT scan of your head and an MRI of your head which were both unremarkable. Your dizziness is casued by benign positional vertigo, which is when moving your head to a different position causes you to feel dizzy. You were given meclizine and you improved. Continue to take meclizine if you feel dizzy. Please follow-up with your primary care provider Dr. Chua within 1-2 weeks of discharge.      SECONDARY DISCHARGE DIAGNOSES  Diagnosis: Hyperlipidemia  Assessment and Plan of Treatment: Continue cholesterol control medications. Continue DASH diet. Follow up with your PCP within 1 week of discharge for further management and monitoring of lipid and cholesterol panels.    Diagnosis: Hypothyroidism  Assessment and Plan of Treatment: Please continue to take your Synthyroid as directed. Call if your doctor if you experience any constipation, fatigue, hairr loss, or weight gain. Please follow-up with your primary care provider within 1-2 weeks of discharge.    Diagnosis: Type 2 diabetes mellitus  Assessment and Plan of Treatment: Your A1C was 8.0% while in the hospital. Continue consistent carbohydrate diet.  Monitor blood glucose levels throughout the day before meals and at bedtime.  Record blood sugars and bring to outpatient providers appointment in order to be reviewed by your doctor for management modifications.  Be aware of diabetes management symptoms including feeling cool and clammy may be related to low glucose levels.  Feeling hot and dry may indicate high glucose levels.  If  you feel these symptoms, check your blood sugar.  Make regular podiatry appointments in order to have feet checked for wounds and toe nails cut by a doctor to prevent infections.

## 2022-06-24 NOTE — OCCUPATIONAL THERAPY INITIAL EVALUATION ADULT - FINE MOTOR COORDINATION, RIGHT HAND THUMB/FINGER OPPOSITION SKILLS, OT EVAL
Denies known Latex allergy or symptoms of Latex sensitivity.  Medications verified, no changes.  Ruiz Magallanes MD    Ms. Osiris Henson a 70 year old female is here for a IOL Master.      Blood sugars are coming down  per pt.       Hemoglobin A1C (%)   Date Value   11/14/2016 8.9 (H)         Patient was given prescriptions for post-op medications and post-op kit to be filled at the [facility] at the [facility].    Reviewed consents and signed.    Ocular Medications: NONE     Have you had prior lasik surgery? no  Do you wear contact lenses? no  Do you take flomax? 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   • Depressive disorder, not elsewhere classified    • Dysmetabolic syndrome X 12/2007   • Herpetic irving 2005   • Malignant Melanoma 4/10/09     mid back   • Mixed hyperlipidemia 12/2007   • Presbyopia 7/11/2003   • TEAR FILM INSUFFIC NOS 7/11/2003   • Type II or unspecified type diabetes mellitus without mention of complication, not stated as uncontrolled    • Unspecified essential hypertension    • Unspecified intestinal obstruction 5/2007     small bowel obstruction secondary to adhesions   • VITREOUS OPACITIES NEC 7/11/2003     POH:  WEARS GLASSES, CATARACTS, BOTH EYES, DRY EYES, EPIRETINAL MEMBRANE, OS, PVD, OD  and VITREOUS TRACTION SYNDROME  FOH:  CATARACTS  SH:  FORMER SMOKER     LAST VISUAL FIELD:  09/16/2015~ptosis  LAST OCT:  07/15/2015  LAST RETINAL DISC PHOTOS: na  LAST DILATED EXAM:  01/20/2017     normal performance

## 2022-06-24 NOTE — OCCUPATIONAL THERAPY INITIAL EVALUATION ADULT - GENERAL OBSERVATIONS, REHAB EVAL
Pt. received semisupine in bed. No acute distress. Patient agreed to evaluation from Occupational Therapist. Son bedside.

## 2022-06-24 NOTE — PHYSICAL THERAPY INITIAL EVALUATION ADULT - PERTINENT HX OF CURRENT PROBLEM, REHAB EVAL
This is a 66 year old male with PMHx of Type 2 DM, hypothyroidism, HLD brought in by EMS from home for acute onset dizziness.

## 2022-06-24 NOTE — H&P ADULT - NSHPSOCIALHISTORY_GEN_ALL_CORE
Tobacco Usage:  (x) never smoked   ( ) former smoker  ( ) current smoker; Packs per day:   Alcohol Usage: (x) none  ( ) occasional ( ) 2-3 times a week ( ) daily; Last drink:   Recreational drugs (x) None    Lives with wife  Ambulates without assistance   Denies Recent travel   Vaccinated 3x with Moderna

## 2022-06-24 NOTE — PATIENT PROFILE ADULT - FALL HARM RISK - HARM RISK INTERVENTIONS
Assistance with ambulation/Assistance OOB with selected safe patient handling equipment/Communicate Risk of Fall with Harm to all staff/Monitor gait and stability/Reinforce activity limits and safety measures with patient and family/Sit up slowly, dangle for a short time, stand at bedside before walking/Tailored Fall Risk Interventions/Visual Cue: Yellow wristband and red socks/Bed in lowest position, wheels locked, appropriate side rails in place/Call bell, personal items and telephone in reach/Instruct patient to call for assistance before getting out of bed or chair/Non-slip footwear when patient is out of bed/[place] to call system/Physically safe environment - no spills, clutter or unnecessary equipment/Purposeful Proactive Rounding/Room/bathroom lighting operational, light cord in reach

## 2022-06-24 NOTE — PHYSICAL THERAPY INITIAL EVALUATION ADULT - ASR EQUIP NEEDS DISCH PT EVAL
no necessary equipment needed at this time. please folow therapy for ongoing fucntional mobility assessment

## 2022-06-24 NOTE — DISCHARGE NOTE PROVIDER - NSDCHC_MEDRECSTATUS_GEN_ALL_CORE
Admission Reconciliation is Completed  Discharge Reconciliation is Not Complete Admission Reconciliation is Completed  Discharge Reconciliation is Completed Mike, Mimi RAMIREZ  (RN)  2018 12:15:48 Fransisca Davis)  2018 01:19:55

## 2022-06-24 NOTE — PHYSICAL THERAPY INITIAL EVALUATION ADULT - ADDITIONAL COMMENTS
pt. reporting onset of dizziness symptoms during change in body position (supine to sit; sit to stand). Symptoms subsided after 2-3 minute rest breaks. Pt. left comfortable in bed with all tubes/lines intact, call bell in reach and in NAD.

## 2022-06-25 LAB
ANION GAP SERPL CALC-SCNC: 10 MMOL/L — SIGNIFICANT CHANGE UP (ref 7–14)
BASOPHILS # BLD AUTO: 0.05 K/UL — SIGNIFICANT CHANGE UP (ref 0–0.2)
BASOPHILS NFR BLD AUTO: 0.9 % — SIGNIFICANT CHANGE UP (ref 0–2)
BUN SERPL-MCNC: 12 MG/DL — SIGNIFICANT CHANGE UP (ref 7–23)
CALCIUM SERPL-MCNC: 9 MG/DL — SIGNIFICANT CHANGE UP (ref 8.4–10.5)
CHLORIDE SERPL-SCNC: 105 MMOL/L — SIGNIFICANT CHANGE UP (ref 98–107)
CO2 SERPL-SCNC: 23 MMOL/L — SIGNIFICANT CHANGE UP (ref 22–31)
CREAT SERPL-MCNC: 1.04 MG/DL — SIGNIFICANT CHANGE UP (ref 0.5–1.3)
EGFR: 80 ML/MIN/1.73M2 — SIGNIFICANT CHANGE UP
EOSINOPHIL # BLD AUTO: 0.42 K/UL — SIGNIFICANT CHANGE UP (ref 0–0.5)
EOSINOPHIL NFR BLD AUTO: 7.6 % — HIGH (ref 0–6)
GLUCOSE SERPL-MCNC: 154 MG/DL — HIGH (ref 70–99)
HCT VFR BLD CALC: 44.3 % — SIGNIFICANT CHANGE UP (ref 39–50)
HGB BLD-MCNC: 13.5 G/DL — SIGNIFICANT CHANGE UP (ref 13–17)
IANC: 2.77 K/UL — SIGNIFICANT CHANGE UP (ref 1.8–7.4)
IMM GRANULOCYTES NFR BLD AUTO: 0.5 % — SIGNIFICANT CHANGE UP (ref 0–1.5)
LYMPHOCYTES # BLD AUTO: 1.67 K/UL — SIGNIFICANT CHANGE UP (ref 1–3.3)
LYMPHOCYTES # BLD AUTO: 30.3 % — SIGNIFICANT CHANGE UP (ref 13–44)
MAGNESIUM SERPL-MCNC: 2 MG/DL — SIGNIFICANT CHANGE UP (ref 1.6–2.6)
MCHC RBC-ENTMCNC: 28.4 PG — SIGNIFICANT CHANGE UP (ref 27–34)
MCHC RBC-ENTMCNC: 30.5 GM/DL — LOW (ref 32–36)
MCV RBC AUTO: 93.1 FL — SIGNIFICANT CHANGE UP (ref 80–100)
MONOCYTES # BLD AUTO: 0.57 K/UL — SIGNIFICANT CHANGE UP (ref 0–0.9)
MONOCYTES NFR BLD AUTO: 10.3 % — SIGNIFICANT CHANGE UP (ref 2–14)
NEUTROPHILS # BLD AUTO: 2.77 K/UL — SIGNIFICANT CHANGE UP (ref 1.8–7.4)
NEUTROPHILS NFR BLD AUTO: 50.4 % — SIGNIFICANT CHANGE UP (ref 43–77)
NRBC # BLD: 0 /100 WBCS — SIGNIFICANT CHANGE UP
NRBC # FLD: 0 K/UL — SIGNIFICANT CHANGE UP
PHOSPHATE SERPL-MCNC: 3.2 MG/DL — SIGNIFICANT CHANGE UP (ref 2.5–4.5)
PLATELET # BLD AUTO: 154 K/UL — SIGNIFICANT CHANGE UP (ref 150–400)
POTASSIUM SERPL-MCNC: 4.1 MMOL/L — SIGNIFICANT CHANGE UP (ref 3.5–5.3)
POTASSIUM SERPL-SCNC: 4.1 MMOL/L — SIGNIFICANT CHANGE UP (ref 3.5–5.3)
RBC # BLD: 4.76 M/UL — SIGNIFICANT CHANGE UP (ref 4.2–5.8)
RBC # FLD: 14 % — SIGNIFICANT CHANGE UP (ref 10.3–14.5)
SODIUM SERPL-SCNC: 138 MMOL/L — SIGNIFICANT CHANGE UP (ref 135–145)
WBC # BLD: 5.51 K/UL — SIGNIFICANT CHANGE UP (ref 3.8–10.5)
WBC # FLD AUTO: 5.51 K/UL — SIGNIFICANT CHANGE UP (ref 3.8–10.5)

## 2022-06-25 PROCEDURE — 70553 MRI BRAIN STEM W/O & W/DYE: CPT | Mod: 26

## 2022-06-25 PROCEDURE — 99233 SBSQ HOSP IP/OBS HIGH 50: CPT

## 2022-06-25 RX ADMIN — Medication 81 MILLIGRAM(S): at 13:02

## 2022-06-25 RX ADMIN — Medication 100 MICROGRAM(S): at 05:54

## 2022-06-25 RX ADMIN — ATORVASTATIN CALCIUM 20 MILLIGRAM(S): 80 TABLET, FILM COATED ORAL at 21:47

## 2022-06-25 RX ADMIN — Medication 1: at 13:02

## 2022-06-25 RX ADMIN — Medication 25 MILLIGRAM(S): at 05:54

## 2022-06-25 RX ADMIN — HEPARIN SODIUM 5000 UNIT(S): 5000 INJECTION INTRAVENOUS; SUBCUTANEOUS at 17:03

## 2022-06-25 RX ADMIN — Medication 25 MILLIGRAM(S): at 16:25

## 2022-06-25 RX ADMIN — HEPARIN SODIUM 5000 UNIT(S): 5000 INJECTION INTRAVENOUS; SUBCUTANEOUS at 05:54

## 2022-06-25 NOTE — CHART NOTE - NSCHARTNOTEFT_GEN_A_CORE
MR Head w/wo IV Cont (06.25.22 @ 14:43)     ACC: [id number] EXAM:  MR BRAIN WAW IC                          INTERPRETATION:  CLINICAL INDICATION: Dizziness    Comparison is made with the prior brain CT of 6/23/2022.    The fourth, third and lateral ventricles are normal in size and position.   There is no hemorrhage, mass or shift of the midline structures. No   minimal small vessel white matter ischemic changes are noted. No acute   infarcts are seen. There is no old or new hemorrhage.    After contrast administration there is no abnormal parenchymal or   leptomeningeal enhancement. There is normal vascular enhancement.    The sellar and parasellar structures are unremarkable. The paranasal   sinuses are clear. There are small bilateral mastoid effusions.    IMPRESSION: Minimal small vessel white matter ischemic changes. No acute   infarcts, hemorrhage or mass. No abnormal enhancement.    --  Central etiology for dizziness ruled out with MRI brain w/wo contrast - no acute ischemic infarction.     Neurology signing off. Please reconsult PRN or call [facility]ra [phone] with any questions.       -  Oral Vilchis MD  PGY-2 Neurology   Spectra #[id number] MR Head w/wo IV Cont (06.25.22 @ 14:43)     ACC: [id number] EXAM:  MR BRAIN WAW IC                          INTERPRETATION:  CLINICAL INDICATION: Dizziness    Comparison is made with the prior brain CT of 6/23/2022.    The fourth, third and lateral ventricles are normal in size and position.   There is no hemorrhage, mass or shift of the midline structures. No   minimal small vessel white matter ischemic changes are noted. No acute   infarcts are seen. There is no old or new hemorrhage.    After contrast administration there is no abnormal parenchymal or   leptomeningeal enhancement. There is normal vascular enhancement.    The sellar and parasellar structures are unremarkable. The paranasal   sinuses are clear. There are small bilateral mastoid effusions.    IMPRESSION: Minimal small vessel white matter ischemic changes. No acute   infarcts, hemorrhage or mass. No abnormal enhancement.    --  Central etiology for dizziness ruled out with MRI brain w/wo contrast - no acute ischemic infarction.     Neurology signing off. Please reconsult PRN or call [facility]ra [phone] with any questions.       -  Oral Vilchis MD  PGY-2 Neurology   Spectra #[id number]    Thank you

## 2022-06-25 NOTE — PROVIDER CONTACT NOTE (OTHER) - ACTION/TREATMENT ORDERED:
DENEEN Brown. Will continue to monitor
As per PA, endorse to oncoming nurse to try for ortho's after breakfast.

## 2022-06-25 NOTE — PROVIDER CONTACT NOTE (OTHER) - ASSESSMENT
On assessment, pt. is asymptomatic. Denies SOB and/or chest pain at this time. HR sustaining in the 50's on tele. Pt. refused to stand for orthostatic bp's, states "I am too tired, let me sleep."
Patient asymptomatic. No complains of SOB, Chest pain, difficulty breathing or vision changes.

## 2022-06-25 NOTE — PROVIDER CONTACT NOTE (OTHER) - BACKGROUND
65yo male w/PMHx of Type 2 DM, hypothyroidism, HLD brought in by EMS from home for acute onset dizziness. CTA Brain and Neck - w/o acute findings.
Admitting dx: dizziness. PMH: DM, HTN.

## 2022-06-26 ENCOUNTER — TRANSCRIPTION ENCOUNTER (OUTPATIENT)
Age: 67
End: 2022-06-26

## 2022-06-26 VITALS
TEMPERATURE: 98 F | RESPIRATION RATE: 18 BRPM | OXYGEN SATURATION: 99 % | DIASTOLIC BLOOD PRESSURE: 65 MMHG | HEART RATE: 60 BPM | SYSTOLIC BLOOD PRESSURE: 110 MMHG

## 2022-06-26 LAB
ANION GAP SERPL CALC-SCNC: 11 MMOL/L — SIGNIFICANT CHANGE UP (ref 7–14)
BASOPHILS # BLD AUTO: 0.06 K/UL — SIGNIFICANT CHANGE UP (ref 0–0.2)
BASOPHILS NFR BLD AUTO: 1.3 % — SIGNIFICANT CHANGE UP (ref 0–2)
BUN SERPL-MCNC: 10 MG/DL — SIGNIFICANT CHANGE UP (ref 7–23)
CALCIUM SERPL-MCNC: 8.9 MG/DL — SIGNIFICANT CHANGE UP (ref 8.4–10.5)
CHLORIDE SERPL-SCNC: 105 MMOL/L — SIGNIFICANT CHANGE UP (ref 98–107)
CO2 SERPL-SCNC: 23 MMOL/L — SIGNIFICANT CHANGE UP (ref 22–31)
CREAT SERPL-MCNC: 1.04 MG/DL — SIGNIFICANT CHANGE UP (ref 0.5–1.3)
EGFR: 80 ML/MIN/1.73M2 — SIGNIFICANT CHANGE UP
EOSINOPHIL # BLD AUTO: 0.4 K/UL — SIGNIFICANT CHANGE UP (ref 0–0.5)
EOSINOPHIL NFR BLD AUTO: 8.5 % — HIGH (ref 0–6)
GLUCOSE SERPL-MCNC: 144 MG/DL — HIGH (ref 70–99)
HCT VFR BLD CALC: 44.3 % — SIGNIFICANT CHANGE UP (ref 39–50)
HGB BLD-MCNC: 13.7 G/DL — SIGNIFICANT CHANGE UP (ref 13–17)
IANC: 2.14 K/UL — SIGNIFICANT CHANGE UP (ref 1.8–7.4)
IMM GRANULOCYTES NFR BLD AUTO: 0.6 % — SIGNIFICANT CHANGE UP (ref 0–1.5)
LYMPHOCYTES # BLD AUTO: 1.58 K/UL — SIGNIFICANT CHANGE UP (ref 1–3.3)
LYMPHOCYTES # BLD AUTO: 33.4 % — SIGNIFICANT CHANGE UP (ref 13–44)
MAGNESIUM SERPL-MCNC: 2.1 MG/DL — SIGNIFICANT CHANGE UP (ref 1.6–2.6)
MCHC RBC-ENTMCNC: 28.4 PG — SIGNIFICANT CHANGE UP (ref 27–34)
MCHC RBC-ENTMCNC: 30.9 GM/DL — LOW (ref 32–36)
MCV RBC AUTO: 91.7 FL — SIGNIFICANT CHANGE UP (ref 80–100)
MONOCYTES # BLD AUTO: 0.52 K/UL — SIGNIFICANT CHANGE UP (ref 0–0.9)
MONOCYTES NFR BLD AUTO: 11 % — SIGNIFICANT CHANGE UP (ref 2–14)
NEUTROPHILS # BLD AUTO: 2.14 K/UL — SIGNIFICANT CHANGE UP (ref 1.8–7.4)
NEUTROPHILS NFR BLD AUTO: 45.2 % — SIGNIFICANT CHANGE UP (ref 43–77)
NRBC # BLD: 0 /100 WBCS — SIGNIFICANT CHANGE UP
NRBC # FLD: 0 K/UL — SIGNIFICANT CHANGE UP
PHOSPHATE SERPL-MCNC: 3 MG/DL — SIGNIFICANT CHANGE UP (ref 2.5–4.5)
PLATELET # BLD AUTO: 159 K/UL — SIGNIFICANT CHANGE UP (ref 150–400)
POTASSIUM SERPL-MCNC: 4 MMOL/L — SIGNIFICANT CHANGE UP (ref 3.5–5.3)
POTASSIUM SERPL-SCNC: 4 MMOL/L — SIGNIFICANT CHANGE UP (ref 3.5–5.3)
RBC # BLD: 4.83 M/UL — SIGNIFICANT CHANGE UP (ref 4.2–5.8)
RBC # FLD: 13.9 % — SIGNIFICANT CHANGE UP (ref 10.3–14.5)
SODIUM SERPL-SCNC: 139 MMOL/L — SIGNIFICANT CHANGE UP (ref 135–145)
WBC # BLD: 4.73 K/UL — SIGNIFICANT CHANGE UP (ref 3.8–10.5)
WBC # FLD AUTO: 4.73 K/UL — SIGNIFICANT CHANGE UP (ref 3.8–10.5)

## 2022-06-26 PROCEDURE — 99239 HOSP IP/OBS DSCHRG MGMT >30: CPT

## 2022-06-26 RX ORDER — MECLIZINE HCL 12.5 MG
1 TABLET ORAL
Qty: 21 | Refills: 0
Start: 2022-06-26 | End: 2022-07-02

## 2022-06-26 RX ADMIN — Medication 25 MILLIGRAM(S): at 01:24

## 2022-06-26 RX ADMIN — Medication 25 MILLIGRAM(S): at 12:34

## 2022-06-26 RX ADMIN — HEPARIN SODIUM 5000 UNIT(S): 5000 INJECTION INTRAVENOUS; SUBCUTANEOUS at 05:24

## 2022-06-26 RX ADMIN — Medication 100 MICROGRAM(S): at 05:24

## 2022-06-26 RX ADMIN — Medication 81 MILLIGRAM(S): at 12:33

## 2022-06-26 NOTE — DISCHARGE NOTE NURSING/CASE MANAGEMENT/SOCIAL WORK - PATIENT PORTAL LINK FT
You can access the FollowMyHealth Patient Portal offered by [facility] by registering at the following website: [email or web address]. By joining [employer]’s FollowMyHealth portal, you will also be able to view your health information using other applications (apps) compatible with our system.

## 2022-06-26 NOTE — PROGRESS NOTE ADULT - PROBLEM SELECTOR PLAN 1
awaiting MR Brain to r/o posterior circuit CVA vs schwannoma  Meclizine PRN for vertigo  PT eval for safe disposition - home  appreciate Neuro consult  - still feel unsteady on his feet
MR Brain reviewed - no Acute CVA, mass noted.  Meclizine PRN for vertigo  PT eval for safe disposition - home  appreciate Neuro consult
awaiting MR Brain to r/o posterior circuit CVA vs schwannoma  Meclizine PRN for vertigo  PT eval for safe disposition  appreciate Neuro consult

## 2022-06-26 NOTE — DISCHARGE NOTE NURSING/CASE MANAGEMENT/SOCIAL WORK - BRAND OF FIRST COVID-19 BOOSTER
[Dyslipidemia] : Dyslipidemia [Diabetes Mellitus] : no Diabetes Melllitus [FreeTextEntry1] : Ms. ALIRIO GUY 69 year F no significant medical history, PSxH of cholecystecomy, CA of the lower eyelid excision, presents to our office today for evaluation of aortic stenosis referred to our office by Dr. Montemayor. She endorse SOB worsening over the past 1 yr, most notably during the past 4 months. She reports the SOB is occasional, and she denies fatigue. She reports dizziness when she lays flat and uses 3 pillows to sit up to watch TV, but does not require the 3 pillows to sleep. She lives at home with her  and is independent in her ADLs. She is a retired [employer] , and most recently a retired [employer] employee. \par \par \par Her healthcare team is as follows:\par PMD: Maggi Chen\par Cardio: Heidi Montemayor\par  [Infectious Endocarditis] : no infectious endocarditis [Hypertension] : no hypertension [Dialysis] : no dialysis [Inhaled Medication Therapy] : no inhaled medication therapy [Home Oxygen] : no home oxygen use [Liver Disease] : no liver disease [Sleep Apnea] : no sleep apnea [Immunocompromise Present] : not immunocompromised [Unresponsive Neurologic State] : not in a unresponsive neurologic state [Peripheral Artery Disease] : no peripheral artery disease [Syncope] : no syncope [Prior CVA] : with no prior CVA [Cerebrovascular Disease] : no cerebrovascular disease [Prior Heart Failure] : no prior heart failure [CVD TIA] : no CVD Tia [Prior Myocardial Infarction] : No prior myocardial infarction [employer]

## 2022-06-26 NOTE — PROGRESS NOTE ADULT - ASSESSMENT
66M DM type 2 - A1c 8, Hypothyroid, p/w vertigo - ruled out acute CVA.
66M DM type 2 - A1c 8, Hypothyroid, p/w vertigo - rule out acute CVA.
66M DM type 2 - A1c 8, Hypothyroid, p/w BPPV - acute CVA ruled out.

## 2022-06-26 NOTE — DISCHARGE NOTE NURSING/CASE MANAGEMENT/SOCIAL WORK - NSDCFUADDAPPT_GEN_ALL_CORE_FT
Please follow-up with your primary care provider, Dr. Chua, within 1-2 weeks of discharge. Please call for an appointment.      Please follow-up with your neurologist, Dr. Mejia, for your scheduled appointment on August 2nd at 8:00am.

## 2022-06-26 NOTE — PROGRESS NOTE ADULT - NSPROGADDITIONALINFOA_GEN_ALL_CORE
Discussed with son by the bedside on 6/25 for 20 minutes.  Answered all the questions.
Discussed with son by the bedside on 6/26 for 20 minutes.  Answered all the questions.  Patient medically optimized for discharge.  Discharge planning 40 minutes - discussed with patient and consultants.

## 2022-06-26 NOTE — PROGRESS NOTE ADULT - SUBJECTIVE AND OBJECTIVE BOX
[facility] Division of Hospital Medicine  El Corea MD  Pager (M-F, 8A-5P): [phone]  Other Times:  [phone]    Patient is a 66y old  Male who presents with a chief complaint of dizziness (25 Jun 2022 14:01)    SUBJECTIVE / OVERNIGHT EVENTS:  Patient states that he feels slightly better than yesterday. No F/C, N/V, CP, SOB, Cough, lightheadedness, abdominal pain, diarrhea, dysuria.    MEDICATIONS  (STANDING):  aspirin  chewable 81 milliGRAM(s) Oral daily  atorvastatin 20 milliGRAM(s) Oral at bedtime  dextrose 5%. 1000 milliLiter(s) (100 mL/Hr) IV Continuous <Continuous>  dextrose 5%. 1000 milliLiter(s) (50 mL/Hr) IV Continuous <Continuous>  dextrose 50% Injectable 25 Gram(s) IV Push once  dextrose 50% Injectable 12.5 Gram(s) IV Push once  dextrose 50% Injectable 25 Gram(s) IV Push once  glucagon  Injectable 1 milliGRAM(s) IntraMuscular once  heparin   Injectable 5000 Unit(s) SubCutaneous every 12 hours  insulin lispro (ADMELOG) corrective regimen sliding scale   SubCutaneous three times a day before meals  insulin lispro (ADMELOG) corrective regimen sliding scale   SubCutaneous at bedtime  lactated ringers. 1000 milliLiter(s) (75 mL/Hr) IV Continuous <Continuous>  levothyroxine 100 MICROGram(s) Oral daily    MEDICATIONS  (PRN):  acetaminophen     Tablet .. 650 milliGRAM(s) Oral every 6 hours PRN Temp greater or equal to 38C (100.4F), Mild Pain (1 - 3)  dextrose Oral Gel 15 Gram(s) Oral once PRN Blood Glucose LESS THAN 70 milliGRAM(s)/deciliter  meclizine 25 milliGRAM(s) Oral every 8 hours PRN Dizziness      Vital Signs Last 24 Hrs  T(C): 36.9 (26 Jun 2022 12:00), Max: 37 (25 Jun 2022 17:50)  T(F): 98.4 (26 Jun 2022 12:00), Max: 98.6 (25 Jun 2022 17:50)  HR: 60 (26 Jun 2022 12:00) (60 - 68)  BP: 110/65 (26 Jun 2022 12:00) (98/56 - 114/67)  BP(mean): --  RR: 18 (26 Jun 2022 12:00) (18 - 18)  SpO2: 99% (26 Jun 2022 12:00) (98% - 99%)  CAPILLARY BLOOD GLUCOSE      POCT Blood Glucose.: 140 mg/dL (26 Jun 2022 08:41)  POCT Blood Glucose.: 151 mg/dL (25 Jun 2022 21:23)  POCT Blood Glucose.: 137 mg/dL (25 Jun 2022 17:21)    I&O's Summary      PHYSICAL EXAM:  CONSTITUTIONAL: NAD, well-developed, well-groomed  EYES: PERRLA; conjunctiva and sclera clear  ENMT: Moist oral mucosa, no pharyngeal injection or exudates; normal dentition  NECK: Supple, no palpable masses; no thyromegaly  RESPIRATORY: Normal respiratory effort; lungs are clear to auscultation bilaterally  CARDIOVASCULAR: Regular rate and rhythm, normal S1 and S2, no murmur/rub/gallop; No lower extremity edema; Peripheral pulses are 2+ bilaterally  ABDOMEN: Nontender to palpation, normoactive bowel sounds, no rebound/guarding; No hepatosplenomegaly  MUSCULOSKELETAL:  Did not assess gait; no clubbing or cyanosis of digits; no joint swelling or tenderness to palpation  PSYCH: A+O to person, place, and time; affect appropriate  NEUROLOGY: CN 2-12 are intact and symmetric; no gross sensory deficits   SKIN: No rashes; no palpable lesions    LABS:                        13.7   4.73  )-----------( 159      ( 26 Jun 2022 05:34 )             44.3     06-26    139  |  105  |  10  ----------------------------<  144<H>  4.0   |  23  |  1.04    Ca    8.9      26 Jun 2022 05:34  Phos  3.0     06-26  Mg     2.10     06-26                RADIOLOGY & ADDITIONAL TESTS:  < from: MR Head w/wo IV Cont (06.25.22 @ 14:43) >  The fourth, third and lateral ventricles are normal in size and position.   There is no hemorrhage, mass or shift of the midline structures. No   minimal small vessel white matter ischemic changes are noted. No acute   infarcts are seen. There is no old or new hemorrhage.    After contrast administration there is no abnormal parenchymal or   leptomeningeal enhancement. There is normal vascular enhancement.    The sellar and parasellar structures are unremarkable. The paranasal   sinuses are clear. There are small bilateral mastoid effusions.      IMPRESSION: Minimal small vessel white matter ischemic changes. No acute   infarcts, hemorrhage or mass. No abnormal enhancement.    < end of copied text >    Imaging Personally Reviewed:    Care Discussed with Consultants/Other Providers:  
[facility] Division of Hospital Medicine  El Corea MD  Pager (M-F, 8A-5P): [phone]  Other Times:  [phone]    Patient is a 66y old  Male who presents with a chief complaint of dizziness (2022 16:04)    SUBJECTIVE / OVERNIGHT EVENTS:  MR of brain still not done.  Still with vertigo when getting up. Son by the bedside.  No F/C, N/V, CP, SOB, Cough, lightheadedness, dizziness, abdominal pain, diarrhea, dysuria.    MEDICATIONS  (STANDING):  aspirin  chewable 81 milliGRAM(s) Oral daily  atorvastatin 20 milliGRAM(s) Oral at bedtime  dextrose 5%. 1000 milliLiter(s) (100 mL/Hr) IV Continuous <Continuous>  dextrose 5%. 1000 milliLiter(s) (50 mL/Hr) IV Continuous <Continuous>  dextrose 50% Injectable 25 Gram(s) IV Push once  dextrose 50% Injectable 12.5 Gram(s) IV Push once  dextrose 50% Injectable 25 Gram(s) IV Push once  glucagon  Injectable 1 milliGRAM(s) IntraMuscular once  heparin   Injectable 5000 Unit(s) SubCutaneous every 12 hours  insulin lispro (ADMELOG) corrective regimen sliding scale   SubCutaneous three times a day before meals  insulin lispro (ADMELOG) corrective regimen sliding scale   SubCutaneous at bedtime  lactated ringers. 1000 milliLiter(s) (75 mL/Hr) IV Continuous <Continuous>  levothyroxine 100 MICROGram(s) Oral daily    MEDICATIONS  (PRN):  acetaminophen     Tablet .. 650 milliGRAM(s) Oral every 6 hours PRN Temp greater or equal to 38C (100.4F), Mild Pain (1 - 3)  dextrose Oral Gel 15 Gram(s) Oral once PRN Blood Glucose LESS THAN 70 milliGRAM(s)/deciliter  meclizine 25 milliGRAM(s) Oral every 8 hours PRN Dizziness      Vital Signs Last 24 Hrs  T(C): 36.6 (2022 05:54), Max: 36.9 (2022 18:03)  T(F): 97.9 (2022 05:54), Max: 98.4 (2022 18:03)  HR: 65 (2022 05:54) (59 - 74)  BP: 102/53 (2022 05:54) (101/58 - 103/54)  BP(mean): --  RR: 18 (2022 05:54) (18 - 18)  SpO2: 97% (2022 05:54) (95% - 97%)  CAPILLARY BLOOD GLUCOSE      POCT Blood Glucose.: 172 mg/dL (2022 12:21)  POCT Blood Glucose.: 145 mg/dL (2022 08:43)  POCT Blood Glucose.: 199 mg/dL (2022 21:11)  POCT Blood Glucose.: 106 mg/dL (2022 16:54)    I&O's Summary      PHYSICAL EXAM:  CONSTITUTIONAL: NAD, well-developed, well-groomed  EYES: PERRLA; conjunctiva and sclera clear  ENMT: Moist oral mucosa, no pharyngeal injection or exudates; normal dentition  NECK: Supple, no palpable masses; no thyromegaly  RESPIRATORY: Normal respiratory effort; lungs are clear to auscultation bilaterally  CARDIOVASCULAR: Regular rate and rhythm, normal S1 and S2, no murmur/rub/gallop; No lower extremity edema; Peripheral pulses are 2+ bilaterally  ABDOMEN: Nontender to palpation, normoactive bowel sounds, no rebound/guarding; No hepatosplenomegaly  MUSCULOSKELETAL:  Did not assess gait; no clubbing or cyanosis of digits; no joint swelling or tenderness to palpation  PSYCH: A+O to person, place, and time; affect appropriate  NEUROLOGY: CN 2-12 are intact and symmetric; no gross sensory deficits   SKIN: No rashes; no palpable lesions    LABS:                        13.5   5.51  )-----------( 154      ( 2022 07:14 )             44.3     06-25    138  |  105  |  12  ----------------------------<  154<H>  4.1   |  23  |  1.04    Ca    9.0      2022 07:14  Phos  3.2     06-25  Mg     2.00     06-25    TPro  6.4  /  Alb  3.9  /  TBili  0.4  /  DBili  x   /  AST  24  /  ALT  25  /  AlkPhos  69  06-24          Urinalysis Basic - ( 2022 10:22 )    Color: Light Yellow / Appearance: Clear / S.013 / pH: x  Gluc: x / Ketone: Negative  / Bili: Negative / Urobili: <2 mg/dL   Blood: x / Protein: Negative / Nitrite: Negative   Leuk Esterase: Negative / RBC: x / WBC x   Sq Epi: x / Non Sq Epi: x / Bacteria: x        RADIOLOGY & ADDITIONAL TESTS:    Imaging Personally Reviewed:    Care Discussed with Consultants/Other Providers:  
[facility] Division of Hospital Medicine  El Corea MD  Pager (MANOJ-JARED, 4N-5P): [phone]  Other Times:  [phone]    Patient is a 66y old  Male who presents with a chief complaint of dizziness (2022 02:12)    SUBJECTIVE / OVERNIGHT EVENTS:  Patient still with vertigo but did not know he could ask for the med.  Educated the patient on using meclizine for his symptoms.  Still awaiting MR of Brain.  No F/C, N/V, CP, SOB, Cough, lightheadedness, abdominal pain, diarrhea, dysuria.    MEDICATIONS  (STANDING):  aspirin  chewable 81 milliGRAM(s) Oral daily  atorvastatin 20 milliGRAM(s) Oral at bedtime  dextrose 5%. 1000 milliLiter(s) (100 mL/Hr) IV Continuous <Continuous>  dextrose 5%. 1000 milliLiter(s) (50 mL/Hr) IV Continuous <Continuous>  dextrose 50% Injectable 25 Gram(s) IV Push once  dextrose 50% Injectable 12.5 Gram(s) IV Push once  dextrose 50% Injectable 25 Gram(s) IV Push once  glucagon  Injectable 1 milliGRAM(s) IntraMuscular once  heparin   Injectable 5000 Unit(s) SubCutaneous every 12 hours  insulin lispro (ADMELOG) corrective regimen sliding scale   SubCutaneous three times a day before meals  insulin lispro (ADMELOG) corrective regimen sliding scale   SubCutaneous at bedtime  lactated ringers. 1000 milliLiter(s) (75 mL/Hr) IV Continuous <Continuous>  levothyroxine 100 MICROGram(s) Oral daily    MEDICATIONS  (PRN):  acetaminophen     Tablet .. 650 milliGRAM(s) Oral every 6 hours PRN Temp greater or equal to 38C (100.4F), Mild Pain (1 - 3)  dextrose Oral Gel 15 Gram(s) Oral once PRN Blood Glucose LESS THAN 70 milliGRAM(s)/deciliter  meclizine 25 milliGRAM(s) Oral every 8 hours PRN Dizziness      Vital Signs Last 24 Hrs  T(C): 37 (2022 09:30), Max: 37 (2022 09:30)  T(F): 98.6 (2022 09:30), Max: 98.6 (2022 09:30)  HR: 55 (2022 05:55) (55 - 67)  BP: 108/63 (2022 05:55) (101/65 - 114/63)  BP(mean): --  RR: 18 (2022 09:30) (17 - 18)  SpO2: 98% (2022 09:30) (96% - 100%)  CAPILLARY BLOOD GLUCOSE      POCT Blood Glucose.: 146 mg/dL (2022 12:34)  POCT Blood Glucose.: 175 mg/dL (2022 09:30)  POCT Blood Glucose.: 159 mg/dL (2022 02:40)  POCT Blood Glucose.: 93 mg/dL (2022 14:29)    I&O's Summary      PHYSICAL EXAM:  CONSTITUTIONAL: NAD, well-developed, well-groomed  EYES: PERRLA; conjunctiva and sclera clear  ENMT: Moist oral mucosa, no pharyngeal injection or exudates; normal dentition  NECK: Supple, no palpable masses; no thyromegaly  RESPIRATORY: Normal respiratory effort; lungs are clear to auscultation bilaterally  CARDIOVASCULAR: Regular rate and rhythm, normal S1 and S2, no murmur/rub/gallop; No lower extremity edema; Peripheral pulses are 2+ bilaterally  ABDOMEN: Nontender to palpation, normoactive bowel sounds, no rebound/guarding; No hepatosplenomegaly  MUSCULOSKELETAL:  Did not assess gait; no clubbing or cyanosis of digits; no joint swelling or tenderness to palpation  PSYCH: A+O to person, place, and time; affect appropriate  NEUROLOGY: CN 2-12 are intact and symmetric; no gross sensory deficits   SKIN: No rashes; no palpable lesions    LABS:                        13.4   5.40  )-----------( 156      ( 2022 05:56 )             44.6     06-24    140  |  103  |  13  ----------------------------<  128<H>  4.0   |  23  |  1.17    Ca    9.4      2022 05:56  Phos  3.6     06-24  Mg     2.20     06-24    TPro  6.4  /  Alb  3.9  /  TBili  0.4  /  DBili  x   /  AST  24  /  ALT  25  /  AlkPhos  69  06-24          Urinalysis Basic - ( 2022 10:22 )    Color: Light Yellow / Appearance: Clear / S.013 / pH: x  Gluc: x / Ketone: Negative  / Bili: Negative / Urobili: <2 mg/dL   Blood: x / Protein: Negative / Nitrite: Negative   Leuk Esterase: Negative / RBC: x / WBC x   Sq Epi: x / Non Sq Epi: x / Bacteria: x        RADIOLOGY & ADDITIONAL TESTS:    Imaging Personally Reviewed:    Care Discussed with Consultants/Other Providers:

## 2022-06-27 PROBLEM — E11.9 TYPE 2 DIABETES MELLITUS WITHOUT COMPLICATIONS: Chronic | Status: ACTIVE | Noted: 2022-06-23

## 2022-06-27 PROBLEM — E03.9 HYPOTHYROIDISM, UNSPECIFIED: Chronic | Status: ACTIVE | Noted: 2022-06-24

## 2022-06-27 PROBLEM — I10 ESSENTIAL (PRIMARY) HYPERTENSION: Chronic | Status: ACTIVE | Noted: 2022-06-23

## 2022-06-27 PROBLEM — E78.5 HYPERLIPIDEMIA, UNSPECIFIED: Chronic | Status: ACTIVE | Noted: 2022-06-24

## 2022-07-21 ENCOUNTER — APPOINTMENT (OUTPATIENT)
Dept: OPHTHALMOLOGY | Facility: CLINIC | Age: 67
End: 2022-07-21

## 2022-07-21 ENCOUNTER — NON-APPOINTMENT (OUTPATIENT)
Age: 67
End: 2022-07-21

## 2022-07-21 PROCEDURE — 76514 ECHO EXAM OF EYE THICKNESS: CPT

## 2022-07-21 PROCEDURE — 92133 CPTRZD OPH DX IMG PST SGM ON: CPT

## 2022-07-21 PROCEDURE — 92014 COMPRE OPH EXAM EST PT 1/>: CPT

## 2022-07-25 ENCOUNTER — APPOINTMENT (OUTPATIENT)
Dept: NEUROLOGY | Facility: CLINIC | Age: 67
End: 2022-07-25

## 2022-07-25 VITALS
BODY MASS INDEX: 26.52 KG/M2 | WEIGHT: 175 LBS | HEIGHT: 68 IN | SYSTOLIC BLOOD PRESSURE: 117 MMHG | DIASTOLIC BLOOD PRESSURE: 70 MMHG | HEART RATE: 71 BPM

## 2022-07-25 PROCEDURE — 99215 OFFICE O/P EST HI 40 MIN: CPT

## 2022-07-25 RX ORDER — DULAGLUTIDE 4.5 MG/.5ML
INJECTION, SOLUTION SUBCUTANEOUS
Refills: 0 | Status: DISCONTINUED | COMMUNITY
End: 2022-07-25

## 2022-07-25 RX ORDER — LISINOPRIL 2.5 MG/1
2.5 TABLET ORAL
Refills: 0 | Status: DISCONTINUED | COMMUNITY
End: 2022-07-25

## 2022-07-25 RX ORDER — SITAGLIPTIN AND METFORMIN HYDROCHLORIDE 50; 500 MG/1; MG/1
50-500 TABLET, FILM COATED ORAL
Refills: 0 | Status: DISCONTINUED | COMMUNITY
End: 2022-07-25

## 2022-07-25 RX ORDER — ASPIRIN 81 MG
81 TABLET, DELAYED RELEASE (ENTERIC COATED) ORAL DAILY
Refills: 0 | Status: DISCONTINUED | COMMUNITY
End: 2022-07-25

## 2022-07-25 NOTE — DATA REVIEWED
[FreeTextEntry1] : MRI Lumbar Spine (11/8/20): As above,\par - Multilevel spondylosis, most pronounced at L2-L3\par - Central-left paracentral disc herniation, effacing anterior thecal sac and abutting descending left L3 nerve root\par \par CT Head & CTA Head/Neck ([facility], 6/23/22):\par - No acute intracranial abnormality\par - No intracranial or neck vessel abnormality\par \par Echo ([facility], 6/24/22):\par - LVEF 60%\par - No cardiac embolus or intracardiac shunt present\par - Mild pulmonary hypertension present\par \par MRI Brain w/wo Gadolinium ([facility], 6/25/22):\par - No acute intracranial abnormalities or abnormal enhancement\par - Minimal chronic microvascular changes\par \par

## 2022-07-25 NOTE — PHYSICAL EXAM
[General Appearance - Alert] : alert [General Appearance - In No Acute Distress] : in no acute distress [General Appearance - Well Nourished] : well nourished [General Appearance - Well Developed] : well developed [General Appearance - Well-Appearing] : healthy appearing [Oriented To Time, Place, And Person] : oriented to person, place, and time [Impaired Insight] : insight and judgment were intact [Affect] : the affect was normal [Mood] : the mood was normal [Person] : oriented to person [Place] : oriented to place [Time] : oriented to time [Concentration Intact] : normal concentrating ability [Visual Intact] : visual attention was ~T not ~L decreased [Fluency] : fluency intact [Comprehension] : comprehension intact [Cranial Nerves Optic (II)] : visual acuity intact bilaterally,  visual fields full to confrontation, pupils equal round and reactive to light [Cranial Nerves Trigeminal (V)] : facial sensation intact symmetrically [Cranial Nerves Oculomotor (III)] : extraocular motion intact [Cranial Nerves Facial (VII)] : face symmetrical [Cranial Nerves Vestibulocochlear (VIII)] : hearing was intact bilaterally [Cranial Nerves Glossopharyngeal (IX)] : tongue and palate midline [Cranial Nerves Accessory (XI - Cranial And Spinal)] : head turning and shoulder shrug symmetric [Cranial Nerves Hypoglossal (XII)] : there was no tongue deviation with protrusion [Motor Strength] : muscle strength was normal in all four extremities [No Muscle Atrophy] : normal bulk in all four extremities [Motor Handedness Right-Handed] : the patient is right hand dominant [Sensation Tactile Decrease] : light touch was intact [Sensation Pain / Temperature Decrease] : pain and temperature was intact [Balance] : balance was intact [1+] : Ankle jerk left 1+ [Sclera] : the sclera and conjunctiva were normal [PERRL With Normal Accommodation] : pupils were equal in size, round, reactive to light, with normal accommodation [Extraocular Movements] : extraocular movements were intact [Outer Ear] : the ears and nose were normal in appearance [Oropharynx] : the oropharynx was normal [Neck Appearance] : the appearance of the neck was normal [Auscultation Breath Sounds / Voice Sounds] : lungs were clear to auscultation bilaterally [Heart Rate And Rhythm] : heart rate was normal and rhythm regular [Heart Sounds] : normal S1 and S2 [Arterial Pulses Carotid] : carotid pulses were normal with no bruits [Full Pulse] : the pedal pulses are present [Abdomen Soft] : soft [Abdomen Tenderness] : non-tender [No CVA Tenderness] : no ~M costovertebral angle tenderness [No Spinal Tenderness] : no spinal tenderness [Abnormal Walk] : normal gait [Nail Clubbing] : no clubbing  or cyanosis of the fingernails [Musculoskeletal - Swelling] : no joint swelling seen [Motor Tone] : muscle strength and tone were normal [Skin Color & Pigmentation] : normal skin color and pigmentation [Skin Turgor] : normal skin turgor [] : no rash [Nystagmus] : ~T no ~M nystagmus was seen [Paresis Pronator Drift Right-Sided] : no pronator drift on the right [Paresis Pronator Drift Left-Sided] : no pronator drift on the left [Motor Strength Upper Extremities Bilaterally] : strength was normal in both upper extremities [Motor Strength Lower Extremities Bilaterally] : strength was normal in both lower extremities [Romberg's Sign] : Romberg's sign was negtive [Allodynia] : no ~T allodynia present [Past-pointing] : there was no past-pointing [Tremor] : no tremor present [Dysdiadochokinesia Bilaterally] : not present [Coordination - Dysmetria Impaired Finger-to-Nose Bilateral] : not present [Coordination - Dysmetria Impaired Heel-to-Shin Bilateral] : not present [Plantar Reflex Right Only] : normal on the right [Plantar Reflex Left Only] : normal on the left [___] : absent on the right [___] : absent on the left [FreeTextEntry8] : Normal, narrow-based gait. No difficulty with tiptoe, heel, and tandem gaits.

## 2022-07-25 NOTE — REASON FOR VISIT
[Consultation] : a consultation visit [Other: _____] : [unfilled] [FreeTextEntry1] : Headache, Dizziness, Nausea

## 2022-07-25 NOTE — ASSESSMENT
[FreeTextEntry1] : 67 RHM with likely episode of benign paroxysmal positional vertigo of the right ear, now resolved.

## 2022-07-25 NOTE — HISTORY OF PRESENT ILLNESS
[FreeTextEntry1] : This is a 67-year-old right-handed man who states that he had an episode of room-spinning sensation on 6/23/22. He presented to the [facility] ED, and was admitted until 6/26/22. He had a MRI Brain study, which was normal. He was discharged and had three sessions of physical therapy. He notices that he sometimes feels off balance, especially when he stands up to quickly, with some mild pressure behind his forehead. However, he has not had recurrence of room-spinning sensation or severe headache.

## 2022-08-04 ENCOUNTER — APPOINTMENT (OUTPATIENT)
Dept: OPHTHALMOLOGY | Facility: CLINIC | Age: 67
End: 2022-08-04

## 2022-08-04 ENCOUNTER — NON-APPOINTMENT (OUTPATIENT)
Age: 67
End: 2022-08-04

## 2022-08-04 PROCEDURE — 92012 INTRM OPH EXAM EST PATIENT: CPT

## 2022-08-04 PROCEDURE — 92083 EXTENDED VISUAL FIELD XM: CPT

## 2022-10-05 ENCOUNTER — APPOINTMENT (OUTPATIENT)
Dept: OTOLARYNGOLOGY | Facility: CLINIC | Age: 67
End: 2022-10-05

## 2022-10-05 VITALS
WEIGHT: 177 LBS | SYSTOLIC BLOOD PRESSURE: 122 MMHG | HEART RATE: 69 BPM | DIASTOLIC BLOOD PRESSURE: 70 MMHG | HEIGHT: 68 IN | BODY MASS INDEX: 26.83 KG/M2

## 2022-10-05 DIAGNOSIS — H81.20 VESTIBULAR NEURONITIS, UNSPECIFIED EAR: ICD-10-CM

## 2022-10-05 DIAGNOSIS — H61.23 IMPACTED CERUMEN, BILATERAL: ICD-10-CM

## 2022-10-05 DIAGNOSIS — H93.293 OTHER ABNORMAL AUDITORY PERCEPTIONS, BILATERAL: ICD-10-CM

## 2022-10-05 DIAGNOSIS — H90.6 MIXED CONDUCTIVE AND SENSORINEURAL HEARING LOSS, BILATERAL: ICD-10-CM

## 2022-10-05 DIAGNOSIS — H81.11 BENIGN PAROXYSMAL VERTIGO, RIGHT EAR: ICD-10-CM

## 2022-10-05 PROCEDURE — 92557 COMPREHENSIVE HEARING TEST: CPT

## 2022-10-05 PROCEDURE — G0268 REMOVAL OF IMPACTED WAX MD: CPT

## 2022-10-05 PROCEDURE — 92567 TYMPANOMETRY: CPT

## 2022-10-05 PROCEDURE — 99204 OFFICE O/P NEW MOD 45 MIN: CPT | Mod: 25

## 2022-10-05 RX ORDER — METFORMIN HYDROCHLORIDE 500 MG/1
500 TABLET, COATED ORAL
Qty: 60 | Refills: 0 | Status: ACTIVE | COMMUNITY
Start: 2022-07-25

## 2022-10-05 RX ORDER — LATANOPROST/PF 0.005 %
0.01 DROPS OPHTHALMIC (EYE)
Qty: 2 | Refills: 0 | Status: ACTIVE | COMMUNITY
Start: 2022-07-21

## 2022-10-05 RX ORDER — MECLIZINE HYDROCHLORIDE 25 MG/1
25 TABLET ORAL
Qty: 90 | Refills: 1 | Status: ACTIVE | COMMUNITY
Start: 2022-06-26

## 2022-10-05 RX ORDER — ASPIRIN 81 MG/1
81 TABLET, COATED ORAL
Refills: 0 | Status: ACTIVE | COMMUNITY
Start: 2022-06-28

## 2022-10-05 RX ORDER — EMPAGLIFLOZIN 10 MG/1
10 TABLET, FILM COATED ORAL
Qty: 90 | Refills: 0 | Status: DISCONTINUED | COMMUNITY
Start: 2022-07-07 | End: 2022-10-05

## 2022-10-05 RX ORDER — CYCLOBENZAPRINE HYDROCHLORIDE 10 MG/1
10 TABLET, FILM COATED ORAL 3 TIMES DAILY
Qty: 90 | Refills: 3 | Status: DISCONTINUED | COMMUNITY
End: 2022-10-05

## 2022-10-05 RX ORDER — PIOGLITAZONE HYDROCHLORIDE 30 MG/1
30 TABLET ORAL DAILY
Qty: 30 | Refills: 0 | Status: ACTIVE | COMMUNITY

## 2022-10-06 NOTE — HISTORY OF PRESENT ILLNESS
[de-identified] : 67 year old male presents for initial evaluation for vertigo and intermittent bilateral otalgia.\par First episode of vertigo was in 06/2022--went to [facility] a was prescribed Meclizine with little relief. \par Report he was also prescribed PT with noted relief. \par Denies otorrhea, hearing loss, recent fevers or ear infections, tinnitus, headaches related to hearing. \par Patient describes “dizzy” as spinning, swaying, near-faint, lightheaded\par Reports turning head from left to right in bed is what exacerbates the dizziness.\par Episodes last about 2 weeks\par Denies history of headaches/migraines and motion sickness\par Denies family history of vertigo, headaches/migraines and motion sickness

## 2022-10-06 NOTE — REASON FOR VISIT
[Initial Evaluation] : an initial evaluation for [FreeTextEntry2] : vertigo and intermittent bilateral otalgia

## 2022-10-06 NOTE — ASSESSMENT
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  No cholesteatoma either ear.  Rossy-Hallpike negative for nystagmus, no spontaneous or gaze evoked nystagmus.\par \par I personally reviewed and interpreted prior MRI images and report, which shows no IAC or CPA pathology.  I personally ordered and reviewed an audiogram for his hearing loss, which shows bilateral mixed hearing loss, slightly worse in the right ear.\par \par Assessment is new onset dizziness/vertigo, bilateral mixed hearing loss.  Suspect patient sustained a vestibular neuritis which has since resolved.  Contact office with any recurrence of vertigo, at that time would obtain VNG.  Still with residual imbalance may benefit from further vestibular therapy, to this point patient has only undergone 3 sessions.  Regarding hearing loss, counseled him that he is a candidate for hearing aids bilaterally, but he is not ready to pursue them yet.  Monitor hearing every 2 to 3 years.

## 2022-11-04 ENCOUNTER — APPOINTMENT (OUTPATIENT)
Dept: OPHTHALMOLOGY | Facility: CLINIC | Age: 67
End: 2022-11-04

## 2022-11-16 NOTE — H&P ADULT - PROBLEM/PLAN-5
DISPLAY PLAN FREE TEXT Isotretinoin Counseling: Patient should get monthly blood tests, not donate blood, not drive at night if vision affected, not share medication, and not undergo elective surgery for 6 months after tx completed. Side effects reviewed, pt to contact office should one occur.

## 2022-11-18 ENCOUNTER — APPOINTMENT (OUTPATIENT)
Dept: OTOLARYNGOLOGY | Facility: CLINIC | Age: 67
End: 2022-11-18

## 2022-12-05 ENCOUNTER — APPOINTMENT (OUTPATIENT)
Dept: OTOLARYNGOLOGY | Facility: CLINIC | Age: 67
End: 2022-12-05

## 2022-12-06 ENCOUNTER — APPOINTMENT (OUTPATIENT)
Dept: NEUROLOGY | Facility: CLINIC | Age: 67
End: 2022-12-06

## 2023-01-03 ENCOUNTER — APPOINTMENT (OUTPATIENT)
Dept: PHYSICAL MEDICINE AND REHAB | Facility: CLINIC | Age: 68
End: 2023-01-03
Payer: COMMERCIAL

## 2023-01-03 VITALS
BODY MASS INDEX: 21.22 KG/M2 | TEMPERATURE: 97.6 F | SYSTOLIC BLOOD PRESSURE: 97 MMHG | OXYGEN SATURATION: 98 % | DIASTOLIC BLOOD PRESSURE: 65 MMHG | HEIGHT: 68 IN | HEART RATE: 69 BPM | WEIGHT: 140 LBS

## 2023-01-03 DIAGNOSIS — M54.50 LOW BACK PAIN, UNSPECIFIED: ICD-10-CM

## 2023-01-03 DIAGNOSIS — M53.3 SACROCOCCYGEAL DISORDERS, NOT ELSEWHERE CLASSIFIED: ICD-10-CM

## 2023-01-03 PROCEDURE — 99204 OFFICE O/P NEW MOD 45 MIN: CPT | Mod: GC

## 2023-01-03 RX ORDER — METHOCARBAMOL 750 MG/1
750 TABLET, FILM COATED ORAL
Qty: 30 | Refills: 0 | Status: ACTIVE | COMMUNITY
Start: 2023-01-03 | End: 1900-01-01

## 2023-01-03 NOTE — ASSESSMENT
[FreeTextEntry1] : Mr. RAISSA CASTANO is a 67 year old male with hx of HLD, DM2, rectal bleed who presents with chronic low back pain without radiation into the lower extremities. Pain is axial and left sided. 11/2020 MRI L-spine reviewed and discussed with patient. Pain is likely due to exacerbations of underlying spondylosis with possible sacroiliac joint pain. Denies any red flag signs. Will recommend: \par - MRI L Spine reviewed\par - Start PT 2-3x/week for stretching, strengthening (especially of core muscles), ROM exercises, HEP and modalities PRN including myofascial release, moist heat \par - Tylenol up to 3g/day if needed\par \par RTC in 4-5 weeks. If pain continues, can consider new MRI L Spine. Patient aware of red flag signs including any changes to their bowel/bladder control, groin numbness or new weakness. Patient knows to seek immediate attention by calling 911 or going to nearest ER if these symptoms appear.

## 2023-01-03 NOTE — HISTORY OF PRESENT ILLNESS
[FreeTextEntry1] : Mr. RAISSA CASTANO is a 67 year old male with hx of HLD, DM2, rectal bleed who presents with low back pain. \par \par Location: left lower back\par Onset: 10 years ago, no inciting event\par Provocation/Palliative: worse with inactivity, lying and sitting down. Better with walking, stretching.\par Quality: Aching\par Radiation: None\par Severity: 5/10 (at worst 10/10)\par Timing: Constant, with last severe episode in 2019 for which he went to the ER for pain medication and pain was worse with sneezing. Less severe flare ups last for about a week and he reports not being able to get out of bed for a couple days (but not in over a year)\par \par Denies any associated numbness. Denies any associated leg weakness. Denies any loss of bowel/bladder control or any groin numbness.\par Previous medications trialed: Robaxin PRN, has not taken recently\par Previous procedures relevant to complaint: No hx of spine injections or surgeries.\par Conservative therapy tried?: Last PT was 3-4 years ago. Has not tried ice or heat, topicals.\par

## 2023-01-03 NOTE — REASON FOR VISIT
[Initial Evaluation] : an initial evaluation [Family Member] : family member [FreeTextEntry1] : low back pain

## 2023-01-03 NOTE — DATA REVIEWED
[FreeTextEntry1] : MR L Spine 11/8/20 reviewed by me: left L3 nerve root impingement seen from L2-3 paracentral herniation\par \par   MR Lumbar Spine No Cont             Final\par \par No Documents Attached\par \par \par \par \par   EXAM:  MR SPINE LUMBAR\par \par \par PROCEDURE DATE:  11/08/2020\par \par \par \par INTERPRETATION:  EXAMINATION: MRI lumbar spine without contrast\par \par CLINICAL INFORMATION: Low back pain\par \par TECHNIQUE: Multiplanar, multisequential MR imaging was performed.\par \par FINDINGS: Conus terminates at the L1 level and is normal in signal. Vertebral body heights are maintained. Alignment is normal. There are mild multilevel Modic type II endplate changes anteriorly. There is multilevel disc degeneration.\par \par T12-L1: Small central disc protrusion which slightly effaces the anterior thecal sac without cord impingement. No spinal canal stenosis or foraminal narrowing.\par \par L1-L2: Small right paracentral disc protrusion superimposed on a small disc bulge. No spinal canal stenosis or foraminal narrowing.\par \par L2-L3: Central-left paracentral disc herniation which effaces the anterior thecal sac and abuts the descending left L3 nerve root. This is superimposed on a small disc bulge. Mild to moderate spinal canal narrowing and mild to moderate bilateral foraminal narrowing.\par \par L3-L4: Small central disc protrusion superimposed on a small disc bulge. Mild spinal canal narrowing and mild to moderate bilateral foraminal narrowing.\par \par L4-L5: Small disc bulge with mild osseous ridging. Mild to moderate bilateral foraminal narrowing. No spinal canal stenosis.\par \par L5-S1: Minimal disc bulge with central posterior annular fissure. No spinal canal stenosis or foraminal narrowing.\par \par The imaged portions of the sacroiliac joints are unremarkable.\par \par There is no paraspinal muscle atrophy or edema.\par \par IMPRESSION: Multilevel spondylosis, as above. This is most pronounced at the L2-3 level where there is a central-left paracentral disc herniation which effaces the anterior thecal sac and abuts the descending left L3 nerve root.\par \par \par \par \par \par \par KEVIN CISSE MD; Attending Radiologist\par This document has been electronically signed. Nov 10 2020  9:38AM\par \par  \par \par  Ordered by: NEERAJ GODFREY       Collected/Examined: 08Nov2020 06:46PM       \par Verified by: NEERAJ GODFREY 10Nov2020 09:53AM       \par  Result Communication: Schedule appointment to discuss results;\par Stage: Final       \par  Performed at: [facility] at the [facility]       Resulted: 10Nov2020 09:42AM       Last Updated: 10Nov2020 09:53AM       Accession: [id number]

## 2023-01-03 NOTE — PHYSICAL EXAM
[FreeTextEntry1] : PE:\par Constitutional: In NAD, calm and cooperative\par MSK (Back)\par 	Inspection: no gross swelling identified\par 	Palpation: Tender over left SI joint, no tenderness over right SI joint, no significant TTP over low back\par 	ROM: Pain at end lumbar extension/flexion\par 	Strength: 5/5 strength in bilateral lower extremities\par 	Reflexes: 1+ Patella reflex bilaterally, 1+ Achilles reflex bilaterally, negative clonus bilaterally\par 	Sensation: Intact to light touch in bilateral lower extremities\par Special tests:\par SLR: Negative bilaterally\par NAKUL: positive on the right (pain in left lower back)\par FADIR: negative bilaterally\par Facet loading: positive on the right

## 2023-01-03 NOTE — REVIEW OF SYSTEMS
[Constipation] : constipation [Muscle Pain] : muscle pain [Fever] : no fever [Chest Pain] : no chest pain [Leg Claudication] : no intermittent leg claudication [Lower Ext Edema] : no lower extremity edema [Shortness Of Breath] : no shortness of breath [Diarrhea] : no diarrhea [Incontinence] : no incontinence [Muscle Weakness] : no muscle weakness [Headache] : no headaches [Difficulty Walking] : no difficulty walking [de-identified] : lower back pain

## 2023-01-06 ENCOUNTER — NON-APPOINTMENT (OUTPATIENT)
Age: 68
End: 2023-01-06

## 2023-01-06 ENCOUNTER — APPOINTMENT (OUTPATIENT)
Dept: OPHTHALMOLOGY | Facility: CLINIC | Age: 68
End: 2023-01-06
Payer: COMMERCIAL

## 2023-01-06 PROCEDURE — 92133 CPTRZD OPH DX IMG PST SGM ON: CPT

## 2023-01-06 PROCEDURE — 92012 INTRM OPH EXAM EST PATIENT: CPT

## 2023-03-21 ENCOUNTER — APPOINTMENT (OUTPATIENT)
Dept: OTOLARYNGOLOGY | Facility: CLINIC | Age: 68
End: 2023-03-21

## 2023-05-05 ENCOUNTER — APPOINTMENT (OUTPATIENT)
Dept: OPHTHALMOLOGY | Facility: CLINIC | Age: 68
End: 2023-05-05

## 2023-05-10 ENCOUNTER — APPOINTMENT (OUTPATIENT)
Dept: OPHTHALMOLOGY | Facility: CLINIC | Age: 68
End: 2023-05-10

## 2023-08-25 ENCOUNTER — APPOINTMENT (OUTPATIENT)
Dept: OTOLARYNGOLOGY | Facility: CLINIC | Age: 68
End: 2023-08-25

## 2023-09-29 ENCOUNTER — NON-APPOINTMENT (OUTPATIENT)
Age: 68
End: 2023-09-29

## 2023-09-29 ENCOUNTER — APPOINTMENT (OUTPATIENT)
Dept: OPHTHALMOLOGY | Facility: CLINIC | Age: 68
End: 2023-09-29
Payer: COMMERCIAL

## 2023-09-29 PROCEDURE — 92014 COMPRE OPH EXAM EST PT 1/>: CPT

## 2023-10-02 ENCOUNTER — APPOINTMENT (OUTPATIENT)
Dept: PHYSICAL MEDICINE AND REHAB | Facility: CLINIC | Age: 68
End: 2023-10-02

## 2023-12-01 ENCOUNTER — APPOINTMENT (OUTPATIENT)
Dept: OPHTHALMOLOGY | Facility: CLINIC | Age: 68
End: 2023-12-01
Payer: COMMERCIAL

## 2023-12-01 ENCOUNTER — NON-APPOINTMENT (OUTPATIENT)
Age: 68
End: 2023-12-01

## 2023-12-01 PROCEDURE — 92083 EXTENDED VISUAL FIELD XM: CPT

## 2023-12-01 PROCEDURE — 92012 INTRM OPH EXAM EST PATIENT: CPT

## 2024-01-09 ENCOUNTER — APPOINTMENT (OUTPATIENT)
Dept: PHYSICAL MEDICINE AND REHAB | Facility: CLINIC | Age: 69
End: 2024-01-09
Payer: COMMERCIAL

## 2024-01-09 DIAGNOSIS — M54.50 LOW BACK PAIN, UNSPECIFIED: ICD-10-CM

## 2024-01-09 DIAGNOSIS — G89.29 LOW BACK PAIN, UNSPECIFIED: ICD-10-CM

## 2024-01-09 DIAGNOSIS — M51.26 OTHER INTERVERTEBRAL DISC DISPLACEMENT, LUMBAR REGION: ICD-10-CM

## 2024-01-09 DIAGNOSIS — M47.816 SPONDYLOSIS W/OUT MYELOPATHY OR RADICULOPATHY, LUMBAR REGION: ICD-10-CM

## 2024-01-09 DIAGNOSIS — M51.36 OTHER INTERVERTEBRAL DISC DEGENERATION, LUMBAR REGION: ICD-10-CM

## 2024-01-09 PROCEDURE — 99213 OFFICE O/P EST LOW 20 MIN: CPT

## 2024-01-17 ENCOUNTER — APPOINTMENT (OUTPATIENT)
Dept: OTOLARYNGOLOGY | Facility: CLINIC | Age: 69
End: 2024-01-17
Payer: COMMERCIAL

## 2024-01-17 VITALS — HEIGHT: 68 IN | BODY MASS INDEX: 21.22 KG/M2 | WEIGHT: 140 LBS

## 2024-01-17 DIAGNOSIS — H90.3 SENSORINEURAL HEARING LOSS, BILATERAL: ICD-10-CM

## 2024-01-17 DIAGNOSIS — H81.10 BENIGN PAROXYSMAL VERTIGO, UNSPECIFIED EAR: ICD-10-CM

## 2024-01-17 DIAGNOSIS — R42 DIZZINESS AND GIDDINESS: ICD-10-CM

## 2024-01-17 PROCEDURE — 92557 COMPREHENSIVE HEARING TEST: CPT

## 2024-01-17 PROCEDURE — 92567 TYMPANOMETRY: CPT

## 2024-01-17 PROCEDURE — 99214 OFFICE O/P EST MOD 30 MIN: CPT

## 2024-01-17 RX ORDER — GABAPENTIN 300 MG
300 TABLET ORAL
Refills: 0 | Status: ACTIVE | COMMUNITY

## 2024-01-17 RX ORDER — DICLOFENAC SODIUM 3 G/100G
3 GEL TOPICAL
Refills: 0 | Status: ACTIVE | COMMUNITY

## 2024-01-17 NOTE — ASSESSMENT
[FreeTextEntry1] : Patietn with recurrence of balance issues.  Feel possible BPPV from hx - recommended vng - if normal would recommned vestibular therapy - also started on vestibular exercises. Patient also has siginficant hearing loss and recommended HAE Followup after vng.

## 2024-01-17 NOTE — REASON FOR VISIT
[Subsequent Evaluation] : a subsequent evaluation for [Family Member] : family member [Other: _____] : [unfilled] [FreeTextEntry2] : vertigo

## 2024-01-17 NOTE — HISTORY OF PRESENT ILLNESS
[de-identified] : c/o problem with feelng dizziness.   Had problems about 2 years ago.   Problem occurred suddenly with turning head.  Had MRi done at that time.  Problem with turning head.   Did have vestibular therapy -  has seen Dr Caba in past.  Felt to be vestibular neuritis at Dr Caba visit

## 2024-01-17 NOTE — REVIEW OF SYSTEMS
[Dizziness] : dizziness [Negative] : Heme/Lymph [de-identified] : positional left to right,  [FreeTextEntry1] : symptoms ongoing since 2022 but has immerged since 10 days ago

## 2024-01-23 ENCOUNTER — APPOINTMENT (OUTPATIENT)
Dept: NEUROLOGY | Facility: CLINIC | Age: 69
End: 2024-01-23

## 2024-02-16 ENCOUNTER — APPOINTMENT (OUTPATIENT)
Dept: OTOLARYNGOLOGY | Facility: CLINIC | Age: 69
End: 2024-02-16

## 2024-02-28 ENCOUNTER — APPOINTMENT (OUTPATIENT)
Dept: OTOLARYNGOLOGY | Facility: CLINIC | Age: 69
End: 2024-02-28

## 2024-03-22 ENCOUNTER — APPOINTMENT (OUTPATIENT)
Dept: PULMONOLOGY | Facility: CLINIC | Age: 69
End: 2024-03-22
Payer: COMMERCIAL

## 2024-03-22 VITALS
OXYGEN SATURATION: 96 % | TEMPERATURE: 98 F | BODY MASS INDEX: 24.55 KG/M2 | WEIGHT: 162 LBS | HEART RATE: 69 BPM | SYSTOLIC BLOOD PRESSURE: 106 MMHG | DIASTOLIC BLOOD PRESSURE: 66 MMHG | HEIGHT: 68 IN

## 2024-03-22 PROCEDURE — 99203 OFFICE O/P NEW LOW 30 MIN: CPT

## 2024-03-22 NOTE — DISCUSSION/SUMMARY
[FreeTextEntry1] : I reviewed the chest radiographs and laboratory data that the patient brought back with him.  There is clearly a right upper lobe pneumonia that improved on a subsequent film 4 days later.  After walking more than 300 feet his oxygen saturations were maintained above 97%.  It appears that he is recovering from a significant lobar pneumonia.  I have suggested a follow-up chest x-ray in 4 weeks along with a follow-up visit.  I also instructed his son that if he does not continue to recover or if other symptoms occur, he should contact us.

## 2024-03-22 NOTE — HISTORY OF PRESENT ILLNESS
[TextBox_4] : 68-year-old male accompanied by his son who helped with translation.  While traveling in [place] in [facility], the patient developed a fever with shaking chills, dyspnea and cough and was hospitalized and  found to have a right upper lobe pneumonia.  He was treated with intravenous Augmentin and moxifloxacin and discharged on oral moxifloxacin.  Viral swabs for multiple viruses as well as for dengue were negative.  He had a leukocytosis and hypoxemia.  He was discharged from the hospital a little more than a week ago and he has completed the oral antibiotics.  He had diarrhea for several days which has abated.  The patient has underlying diabetes and hypothyroidism.

## 2024-03-22 NOTE — REVIEW OF SYSTEMS
[Fatigue] : fatigue [SOB on Exertion] : sob on exertion [Diarrhea] : diarrhea [Diabetes] : diabetes [Negative] : Hematologic

## 2024-04-06 ENCOUNTER — APPOINTMENT (OUTPATIENT)
Dept: RADIOLOGY | Facility: IMAGING CENTER | Age: 69
End: 2024-04-06
Payer: COMMERCIAL

## 2024-04-06 ENCOUNTER — OUTPATIENT (OUTPATIENT)
Dept: OUTPATIENT SERVICES | Facility: HOSPITAL | Age: 69
LOS: 1 days | End: 2024-04-06
Payer: COMMERCIAL

## 2024-04-06 DIAGNOSIS — J15.9 UNSPECIFIED BACTERIAL PNEUMONIA: ICD-10-CM

## 2024-04-06 PROCEDURE — 71046 X-RAY EXAM CHEST 2 VIEWS: CPT | Mod: 26

## 2024-04-06 PROCEDURE — 71046 X-RAY EXAM CHEST 2 VIEWS: CPT

## 2024-04-12 ENCOUNTER — APPOINTMENT (OUTPATIENT)
Dept: PULMONOLOGY | Facility: CLINIC | Age: 69
End: 2024-04-12

## 2024-04-12 ENCOUNTER — APPOINTMENT (OUTPATIENT)
Dept: PULMONOLOGY | Facility: CLINIC | Age: 69
End: 2024-04-12
Payer: COMMERCIAL

## 2024-04-12 VITALS
OXYGEN SATURATION: 97 % | SYSTOLIC BLOOD PRESSURE: 95 MMHG | WEIGHT: 162.13 LBS | DIASTOLIC BLOOD PRESSURE: 58 MMHG | HEIGHT: 68 IN | RESPIRATION RATE: 15 BRPM | TEMPERATURE: 97.9 F | HEART RATE: 67 BPM | BODY MASS INDEX: 24.57 KG/M2

## 2024-04-12 DIAGNOSIS — J15.9 UNSPECIFIED BACTERIAL PNEUMONIA: ICD-10-CM

## 2024-04-12 DIAGNOSIS — R05.8 OTHER SPECIFIED COUGH: ICD-10-CM

## 2024-04-12 PROCEDURE — 99213 OFFICE O/P EST LOW 20 MIN: CPT

## 2024-04-12 RX ORDER — FLUTICASONE PROPIONATE 50 UG/1
50 SPRAY, METERED NASAL TWICE DAILY
Qty: 1 | Refills: 3 | Status: ACTIVE | COMMUNITY
Start: 2024-04-12 | End: 1900-01-01

## 2024-04-12 NOTE — DISCUSSION/SUMMARY
[FreeTextEntry1] : The patient's follow-up chest radiograph is normal.  His lungs are clear to exam.  The cough is likely a persistent upper airway cough.  He has been taking over-the-counter cough medicine but that has not helped.  I suggested a topical nasal steroid to be used regularly for the next 3 to 4 weeks.  He will follow-up with me on an as-needed basis.

## 2024-04-12 NOTE — HISTORY OF PRESENT ILLNESS
[TextBox_4] : 68-year-old male accompanied by his son who provided translation.  He developed a significant right upper lobe pneumonia while in [place] and he was treated with intravenous Augmentin and discharged on oral moxifloxacin.  He is markedly improved.  He does admit to a persistent nonproductive cough that occurs throughout the day and night and sometimes is worse at night.  He does throat clearing with it.  He denies any dyspnea, chest pain or any persistent fevers.

## 2024-04-12 NOTE — PHYSICAL EXAM
[No Acute Distress] : no acute distress [Normal Oropharynx] : normal oropharynx [Normal Appearance] : normal appearance [Normal Rate/Rhythm] : normal rate/rhythm [Normal S1, S2] : normal s1, s2 [No Resp Distress] : no resp distress [Clear to Auscultation Bilaterally] : clear to auscultation bilaterally [No Focal Deficits] : no focal deficits

## 2024-04-19 ENCOUNTER — APPOINTMENT (OUTPATIENT)
Dept: OPHTHALMOLOGY | Facility: CLINIC | Age: 69
End: 2024-04-19

## 2024-08-30 ENCOUNTER — NON-APPOINTMENT (OUTPATIENT)
Age: 69
End: 2024-08-30

## 2024-08-30 ENCOUNTER — APPOINTMENT (OUTPATIENT)
Dept: OPHTHALMOLOGY | Facility: CLINIC | Age: 69
End: 2024-08-30
Payer: COMMERCIAL

## 2024-08-30 PROCEDURE — 92133 CPTRZD OPH DX IMG PST SGM ON: CPT

## 2024-08-30 PROCEDURE — 92014 COMPRE OPH EXAM EST PT 1/>: CPT

## 2024-09-25 ENCOUNTER — EMERGENCY (EMERGENCY)
Facility: HOSPITAL | Age: 69
LOS: 1 days | Discharge: HOME | End: 2024-09-25
Attending: EMERGENCY MEDICINE | Admitting: EMERGENCY MEDICINE
Payer: COMMERCIAL

## 2024-09-25 VITALS
RESPIRATION RATE: 18 BRPM | WEIGHT: 179.9 LBS | SYSTOLIC BLOOD PRESSURE: 118 MMHG | HEART RATE: 60 BPM | TEMPERATURE: 98 F | OXYGEN SATURATION: 100 % | DIASTOLIC BLOOD PRESSURE: 71 MMHG

## 2024-09-25 LAB
ALBUMIN SERPL ELPH-MCNC: 3.8 G/DL — SIGNIFICANT CHANGE UP (ref 3.3–5)
ALP SERPL-CCNC: 62 U/L — SIGNIFICANT CHANGE UP (ref 40–120)
ALT FLD-CCNC: 12 U/L — SIGNIFICANT CHANGE UP (ref 4–41)
ANION GAP SERPL CALC-SCNC: 9 MMOL/L — SIGNIFICANT CHANGE UP (ref 7–14)
AST SERPL-CCNC: 19 U/L — SIGNIFICANT CHANGE UP (ref 4–40)
BASOPHILS # BLD AUTO: 0.1 K/UL — SIGNIFICANT CHANGE UP (ref 0–0.2)
BASOPHILS NFR BLD AUTO: 1.8 % — SIGNIFICANT CHANGE UP (ref 0–2)
BILIRUB SERPL-MCNC: 0.4 MG/DL — SIGNIFICANT CHANGE UP (ref 0.2–1.2)
BUN SERPL-MCNC: 14 MG/DL — SIGNIFICANT CHANGE UP (ref 7–23)
CALCIUM SERPL-MCNC: 8.9 MG/DL — SIGNIFICANT CHANGE UP (ref 8.4–10.5)
CHLORIDE SERPL-SCNC: 106 MMOL/L — SIGNIFICANT CHANGE UP (ref 98–107)
CK SERPL-CCNC: 168 U/L — SIGNIFICANT CHANGE UP (ref 30–200)
CO2 SERPL-SCNC: 24 MMOL/L — SIGNIFICANT CHANGE UP (ref 22–31)
CREAT SERPL-MCNC: 1.08 MG/DL — SIGNIFICANT CHANGE UP (ref 0.5–1.3)
EGFR: 75 ML/MIN/1.73M2 — SIGNIFICANT CHANGE UP
EOSINOPHIL # BLD AUTO: 0.15 K/UL — SIGNIFICANT CHANGE UP (ref 0–0.5)
EOSINOPHIL NFR BLD AUTO: 2.7 % — SIGNIFICANT CHANGE UP (ref 0–6)
GLUCOSE SERPL-MCNC: 145 MG/DL — HIGH (ref 70–99)
HCT VFR BLD CALC: 39.9 % — SIGNIFICANT CHANGE UP (ref 39–50)
HGB BLD-MCNC: 12.5 G/DL — LOW (ref 13–17)
IANC: 2.78 K/UL — SIGNIFICANT CHANGE UP (ref 1.8–7.4)
LYMPHOCYTES # BLD AUTO: 1.33 K/UL — SIGNIFICANT CHANGE UP (ref 1–3.3)
LYMPHOCYTES # BLD AUTO: 23.2 % — SIGNIFICANT CHANGE UP (ref 13–44)
MAGNESIUM SERPL-MCNC: 2.2 MG/DL — SIGNIFICANT CHANGE UP (ref 1.6–2.6)
MCHC RBC-ENTMCNC: 28.4 PG — SIGNIFICANT CHANGE UP (ref 27–34)
MCHC RBC-ENTMCNC: 31.3 GM/DL — LOW (ref 32–36)
MCV RBC AUTO: 90.7 FL — SIGNIFICANT CHANGE UP (ref 80–100)
MONOCYTES # BLD AUTO: 0.46 K/UL — SIGNIFICANT CHANGE UP (ref 0–0.9)
MONOCYTES NFR BLD AUTO: 8 % — SIGNIFICANT CHANGE UP (ref 2–14)
NEUTROPHILS # BLD AUTO: 3.23 K/UL — SIGNIFICANT CHANGE UP (ref 1.8–7.4)
NEUTROPHILS NFR BLD AUTO: 54.5 % — SIGNIFICANT CHANGE UP (ref 43–77)
PHOSPHATE SERPL-MCNC: 2.9 MG/DL — SIGNIFICANT CHANGE UP (ref 2.5–4.5)
PLATELET # BLD AUTO: 160 K/UL — SIGNIFICANT CHANGE UP (ref 150–400)
POTASSIUM SERPL-MCNC: 4.4 MMOL/L — SIGNIFICANT CHANGE UP (ref 3.5–5.3)
POTASSIUM SERPL-SCNC: 4.4 MMOL/L — SIGNIFICANT CHANGE UP (ref 3.5–5.3)
PROT SERPL-MCNC: 6.7 G/DL — SIGNIFICANT CHANGE UP (ref 6–8.3)
RBC # BLD: 4.4 M/UL — SIGNIFICANT CHANGE UP (ref 4.2–5.8)
RBC # FLD: 14 % — SIGNIFICANT CHANGE UP (ref 10.3–14.5)
SODIUM SERPL-SCNC: 139 MMOL/L — SIGNIFICANT CHANGE UP (ref 135–145)
WBC # BLD: 5.73 K/UL — SIGNIFICANT CHANGE UP (ref 3.8–10.5)
WBC # FLD AUTO: 5.73 K/UL — SIGNIFICANT CHANGE UP (ref 3.8–10.5)

## 2024-09-25 PROCEDURE — 99284 EMERGENCY DEPT VISIT MOD MDM: CPT

## 2024-09-25 RX ORDER — ACETAMINOPHEN 325 MG/1
975 TABLET ORAL ONCE
Refills: 0 | Status: COMPLETED | OUTPATIENT
Start: 2024-09-25 | End: 2024-09-25

## 2024-09-25 RX ORDER — SODIUM CHLORIDE 9 MG/ML
1000 INJECTION INTRAMUSCULAR; INTRAVENOUS; SUBCUTANEOUS ONCE
Refills: 0 | Status: COMPLETED | OUTPATIENT
Start: 2024-09-25 | End: 2024-09-25

## 2024-09-25 RX ADMIN — ACETAMINOPHEN 975 MILLIGRAM(S): 325 TABLET ORAL at 06:10

## 2024-09-25 RX ADMIN — SODIUM CHLORIDE 1000 MILLILITER(S): 9 INJECTION INTRAMUSCULAR; INTRAVENOUS; SUBCUTANEOUS at 06:11

## 2024-09-25 NOTE — ED ADULT NURSE NOTE - NSFALLUNIVINTERV_ED_ALL_ED
Bed/Stretcher in lowest position, wheels locked, appropriate side rails in place/Call bell, personal items and telephone in reach/Instruct patient to call for assistance before getting out of bed/chair/stretcher/Non-slip footwear applied when patient is off stretcher/[place] to call system/Physically safe environment - no spills, clutter or unnecessary equipment/Purposeful proactive rounding/Room/bathroom lighting operational, light cord in reach

## 2024-09-25 NOTE — ED PROVIDER NOTE - CLINICAL SUMMARY MEDICAL DECISION MAKING FREE TEXT BOX
69 year old man PMH DM, HTN, hypothyroidism presenting with 30 minutes of leg cramping with no current pain and normal physical exam, msk exam, most likely muscle cramping, unlikely statin induced myopathy, COVID / vaccine related myopathy, and DVT given clinical picture.
120

## 2024-09-25 NOTE — ED PROVIDER NOTE - OBJECTIVE STATEMENT
69 year old man PMH DM, HTN, hypothyroidism presenting with 30 minutes of leg cramping. About an hour and a half ago his right leg started feeling very painful between the knee and foot, the pain was so severe he was shaking and he tried massaging the leg to improve the pain. the pain resolved on its own after 30 minutes and shortly after it stopped the same thing happened on the left. He called and ambulance and the pain has been gone since then. He takes atorvostatin 20 mg, reports recent travel to [place], and recently got the COVID vaccine. Denies any fevers, chills, chest pain, SOB, abd pain, N/V/D/C, leg swelling.

## 2024-09-25 NOTE — ED PROVIDER NOTE - DISPOSITION TYPE
DISCHARGE Keystone Flap Text: The defect edges were debeveled with a #15 scalpel blade.  Given the location of the defect, shape of the defect a keystone flap was deemed most appropriate.  Using a sterile surgical marker, an appropriate keystone flap was drawn incorporating the defect, outlining the appropriate donor tissue and placing the expected incisions within the relaxed skin tension lines where possible. The area thus outlined was incised deep to adipose tissue with a #15 scalpel blade.  The skin margins were undermined to an appropriate distance in all directions around the primary defect and laterally outward around the flap utilizing iris scissors.

## 2024-09-25 NOTE — ED PROVIDER NOTE - PROGRESS NOTE DETAILS
Fredy SHOOK PGY1: pain still there but improved since in the ED, able to ambulate without assisstance. Discussed discharge, return precautions, and medications. Fredy SHOOK PGY1: pain still there but improved since in the ED, able to ambulate without assisstance. Discussed discharge, return precautions, and medications. Labs unremarkable.

## 2024-09-25 NOTE — ED ADULT NURSE NOTE - OBJECTIVE STATEMENT
Pt A&Ox4 ambulatory at baseline, PMH DM, HTN, hypothyroidism , presenting to the ED (RM 27) c/o BLE cramping. Pt states was sleeping and was woke up from sleep with R leg cramping. Pt states symptoms slowly resolved and soon after developed L leg cramping as well. Pt denies any other complaints. Pt states symptoms have resolved. Respirations are even and unlabored, NAD, 20G IV LAC, labs sent. medicated as per orders. Safety precautions implemented as per protocol, awaiting further MD orders, plan of care ongoing.

## 2024-09-25 NOTE — ED PROVIDER NOTE - PHYSICAL EXAMINATION
Physical Exam:  Gen: no acute distress, AOx3, nontoxic appearing  Head: NCAT  HEENT: EOMI, PEERLA, normal conjunctiva, tongue midline, oral mucosa moist  Lung: CTAB, no respiratory distress, no wheezes/rhonchi/rales B/L, speaking in full sentences  CV: RRR, no murmurs, rubs or gallops  Abd: soft, NT, ND, no guarding, no rigidity, no rebound tenderness, no CVA tenderness  MSK: no visible deformities, ROM normal in UE/LE, no neck / back pain, calf tenderness  Neuro: No focal sensory or motor deficits  Skin: Warm, well perfused, no rash, no leg swelling

## 2024-09-25 NOTE — ED PROVIDER NOTE - NSFOLLOWUPINSTRUCTIONS_ED_ALL_ED_FT
You were seen in the Emergency Department for leg cramping. your blood work was within normal limits and your pain improved with time and with the tylenol. For pain, you can continue to take tylenol 1000 mg every 6 hours, no more than 4000mg a day, and you can take your home gabapentin as prescribed. Remember to stretch your legs. Please follow up with your primary care doctor.     Return to the Emergency Department if you experience severe leg pain, inability to walk, chest pain, difficulty breathing, or severe muscle pain in other areas of the body.

## 2024-09-25 NOTE — ED PROVIDER NOTE - ATTENDING CONTRIBUTION TO CARE
69 year old man PMH DM, HTN, hypothyroidism and chronic low back pain for which he takes gabapentin for that is presenting with 30 minutes of leg cramping. About an hour and a half ago his right leg started feeling very painful between the knee and foot, the pain was so severe he was shaking and he tried massaging the leg to improve the pain. the pain resolved on its own after 30 minutes and shortly after it stopped the same thing happened on the left leg but also resolved prior to arrival. He called and ambulance and the pain has been gone since then. He takes atorvostatin 20 mg, reports recent travel to [place], and recently got the COVID vaccine. Denies any fevers, chills, chest pain, SOB, abd pain, N/V/D/C, leg swelling.    NAD, awake, alert, BLE normal , no unilateral or bilateral edema, POP/DP/PT pulses are palpable and strong and equal. no signs trauma on BLE, FROM at knees/ankles. Abd soft nontender.     68 yO M with Past Medical History of DM, HTN, Hypothyroid, and chronic LBP that presents with initial RLE cramping at calf then left cramping calf that has since resolved. UNsure if this is just muscle cramp vs lyte abnormalities so we will obtain labs, lytes, and provide pain meds and IVF and reassess. May be dehydration given pt went ot [place] yday which is rare for pt as he usually works from home and may not have drank as much as usual. Will reassess after work up.

## 2024-09-25 NOTE — ED PROVIDER NOTE - PATIENT PORTAL LINK FT
You can access the FollowMyHealth Patient Portal offered by [facility] by registering at the following website: [email or web address]. By joining [facility]’s FollowMyHealth portal, you will also be able to view your health information using other applications (apps) compatible with our system.

## 2024-09-25 NOTE — ED ADULT TRIAGE NOTE - CHIEF COMPLAINT QUOTE
Pt c/o whole body cramping, worse in legs tonight and generalized weakness. Denies chest pain, sob, abd pain, n/v/d, fevers/chills. No neuro deficits. Hx: DM, HTN

## 2024-12-13 ENCOUNTER — NON-APPOINTMENT (OUTPATIENT)
Age: 69
End: 2024-12-13

## 2024-12-13 ENCOUNTER — APPOINTMENT (OUTPATIENT)
Dept: CARDIOLOGY | Facility: CLINIC | Age: 69
End: 2024-12-13
Payer: COMMERCIAL

## 2024-12-13 VITALS
WEIGHT: 169 LBS | SYSTOLIC BLOOD PRESSURE: 123 MMHG | OXYGEN SATURATION: 97 % | DIASTOLIC BLOOD PRESSURE: 79 MMHG | BODY MASS INDEX: 25.7 KG/M2 | HEART RATE: 69 BPM

## 2024-12-13 DIAGNOSIS — R01.1 CARDIAC MURMUR, UNSPECIFIED: ICD-10-CM

## 2024-12-13 DIAGNOSIS — R07.9 CHEST PAIN, UNSPECIFIED: ICD-10-CM

## 2024-12-13 DIAGNOSIS — Z00.00 ENCOUNTER FOR GENERAL ADULT MEDICAL EXAMINATION W/OUT ABNORMAL FINDINGS: ICD-10-CM

## 2024-12-13 DIAGNOSIS — H81.10 BENIGN PAROXYSMAL VERTIGO, UNSPECIFIED EAR: ICD-10-CM

## 2024-12-13 PROCEDURE — 93000 ELECTROCARDIOGRAM COMPLETE: CPT

## 2024-12-13 PROCEDURE — G2211 COMPLEX E/M VISIT ADD ON: CPT | Mod: NC

## 2024-12-13 PROCEDURE — 99203 OFFICE O/P NEW LOW 30 MIN: CPT

## 2024-12-13 PROCEDURE — 99205 OFFICE O/P NEW HI 60 MIN: CPT

## 2024-12-27 ENCOUNTER — APPOINTMENT (OUTPATIENT)
Dept: CARDIOLOGY | Facility: CLINIC | Age: 69
End: 2024-12-27
Payer: COMMERCIAL

## 2024-12-27 PROCEDURE — 93306 TTE W/DOPPLER COMPLETE: CPT

## 2025-02-07 ENCOUNTER — NON-APPOINTMENT (OUTPATIENT)
Age: 70
End: 2025-02-07

## 2025-02-07 ENCOUNTER — APPOINTMENT (OUTPATIENT)
Dept: OPHTHALMOLOGY | Facility: CLINIC | Age: 70
End: 2025-02-07
Payer: COMMERCIAL

## 2025-02-07 PROCEDURE — 92012 INTRM OPH EXAM EST PATIENT: CPT

## 2025-02-07 PROCEDURE — 92083 EXTENDED VISUAL FIELD XM: CPT

## 2025-05-09 ENCOUNTER — APPOINTMENT (OUTPATIENT)
Dept: PHYSICAL MEDICINE AND REHAB | Facility: CLINIC | Age: 70
End: 2025-05-09
Payer: COMMERCIAL

## 2025-05-09 DIAGNOSIS — M25.562 PAIN IN RIGHT KNEE: ICD-10-CM

## 2025-05-09 DIAGNOSIS — M25.561 PAIN IN RIGHT KNEE: ICD-10-CM

## 2025-05-09 DIAGNOSIS — M54.50 LOW BACK PAIN, UNSPECIFIED: ICD-10-CM

## 2025-05-09 PROCEDURE — 99213 OFFICE O/P EST LOW 20 MIN: CPT

## 2025-07-28 ENCOUNTER — APPOINTMENT (OUTPATIENT)
Dept: OPHTHALMOLOGY | Facility: CLINIC | Age: 70
End: 2025-07-28
Payer: COMMERCIAL

## 2025-07-28 ENCOUNTER — NON-APPOINTMENT (OUTPATIENT)
Age: 70
End: 2025-07-28

## 2025-07-28 PROCEDURE — 92012 INTRM OPH EXAM EST PATIENT: CPT

## 2025-09-04 ENCOUNTER — APPOINTMENT (OUTPATIENT)
Dept: OPHTHALMOLOGY | Facility: CLINIC | Age: 70
End: 2025-09-04